# Patient Record
Sex: MALE | Race: WHITE | NOT HISPANIC OR LATINO | Employment: FULL TIME | URBAN - METROPOLITAN AREA
[De-identification: names, ages, dates, MRNs, and addresses within clinical notes are randomized per-mention and may not be internally consistent; named-entity substitution may affect disease eponyms.]

---

## 2018-03-19 ENCOUNTER — COMMUNICATION - HEALTHEAST (OUTPATIENT)
Dept: SCHEDULING | Facility: CLINIC | Age: 48
End: 2018-03-19

## 2018-11-30 ENCOUNTER — OFFICE VISIT (OUTPATIENT)
Dept: OPHTHALMOLOGY | Facility: CLINIC | Age: 48
End: 2018-11-30
Payer: COMMERCIAL

## 2018-11-30 DIAGNOSIS — H52.4 PRESBYOPIA: Primary | ICD-10-CM

## 2018-11-30 ASSESSMENT — REFRACTION_MANIFEST
OD_SPHERE: -1.00
OD_SPHERE: -3.00
OD_AXIS: 100
OD_CYLINDER: +1.75
OD_ADD: +1.25
OS_ADD: +1.25
OD_CYLINDER: +0.75
OD_AXIS: 085
OS_AXIS: 090
OS_CYLINDER: +0.50
OS_AXIS: 087
OS_SPHERE: -1.25
OS_SPHERE: -1.00
OS_CYLINDER: +1.25

## 2018-11-30 ASSESSMENT — EXTERNAL EXAM - RIGHT EYE: OD_EXAM: NORMAL

## 2018-11-30 ASSESSMENT — VISUAL ACUITY
OS_SC: 20/20
OD_SC+: -3
METHOD: SNELLEN - LINEAR
METHOD_MR_RETINOSCOPY: 1
OD_SC: 20/25

## 2018-11-30 ASSESSMENT — SLIT LAMP EXAM - LIDS
COMMENTS: NORMAL
COMMENTS: NORMAL

## 2018-11-30 ASSESSMENT — CONF VISUAL FIELD
OS_NORMAL: 1
OD_NORMAL: 1
METHOD: COUNTING FINGERS

## 2018-11-30 ASSESSMENT — EXTERNAL EXAM - LEFT EYE: OS_EXAM: NORMAL

## 2018-11-30 ASSESSMENT — TONOMETRY
IOP_METHOD: ICARE
OD_IOP_MMHG: 19
OS_IOP_MMHG: 20

## 2018-11-30 ASSESSMENT — CUP TO DISC RATIO
OD_RATIO: 0.2
OS_RATIO: 0.25

## 2018-11-30 NOTE — NURSING NOTE
Chief Complaints and History of Present Illnesses   Patient presents with     COMPREHENSIVE EYE EXAM     HPI    Affected eye(s):  Both   Symptoms:     No blurred vision   No floaters   No flashes   No tearing   No Dryness         Do you have eye pain now?:  No      Comments:    Patient notes that he has been having difficulty reading at near, borrowing wife's reading gls.  Patient notes he has never worn gls or CTL.     Marianela Naqvi November 30, 2018 9:36 AM

## 2018-11-30 NOTE — MR AVS SNAPSHOT
After Visit Summary   2018    Jared Basilio    MRN: [id number]           Patient Information     Date Of Birth          1970        Visit Information        Provider Department      2018 9:40 AM Augustine Rodriguez, [facility]        Today's Diagnoses     Presbyopia    -  1       Follow-ups after your visit        Who to contact     Please call your clinic at [phone] to:    Ask questions about your health    Make or cancel appointments    Discuss your medicines    Learn about your test results    Speak to your doctor            Additional Information About Your Visit        MyChart Information     [employer] is an electronic gateway that provides easy, online access to your medical records. With [employer], you can request a clinic appointment, read your test results, renew a prescription or communicate with your care team.     To sign up for [employer] visit the website at [email or web address]   You will be asked to enter the access code listed below, as well as some personal information. Please follow the directions to create your username and password.     Your access code is: [email or web address]  Expires: 2019  6:30 AM     Your access code will  in 90 days. If you need help or a new code, please contact your [facility] or call [phone] for assistance.        Care EveryWhere ID     This is your Care EveryWhere ID. This could be used by other organizations to access your [facility] medical records  [id number]         Blood Pressure from Last 3 Encounters:   07 100/70   05 112/64   05 100/64    Weight from Last 3 Encounters:   07 87.1 kg (192 lb)   05 84.4 kg (186 lb)   05 81.2 kg (179 lb)              We Performed the Following     REFRACTION [91127]        Primary Care Provider    None Specified       No primary provider on file.        Equal Access to Services     JERSON DALTON: Barbara costello  jameel lovemendel Orantes, waaxda luqadaha, qaybta kaalmada ademat, lio mckeonlori bolivar. So [facility] [phone].    ATENCIÓN: Si habla hilaria, tiene a thomas disposición servicios gratuitos de asistencia lingüística. Audrey al [phone].    We comply with applicable federal civil rights laws and [place] laws. We do not discriminate on the basis of race, color, national origin, age, disability, sex, sexual orientation, or gender identity.            Thank you!     Thank you for choosing [facility] OPHTHALMOLOGY  for your care. Our goal is always to provide you with excellent care. Hearing back from our patients is one way we can continue to improve our services. Please take a few minutes to complete the written survey that you may receive in the mail after your visit with us. Thank you!             Your Updated Medication List - Protect others around you: Learn how to safely use, store and throw away your medicines at www.disposemymeds.org.          This list is accurate as of 11/30/18 10:55 AM.  Always use your most recent med list.                   Brand Name Dispense Instructions for use Diagnosis    ANALPRAM-HC CREA 1-2.5 % EX     30 gm    apply hemorrhoid bid    Anal or rectal pain       ANUSOL-HC 25 MG suppository   Generic drug:  hydrocortisone     10    1 suppos hs    Anal or rectal pain       azithromycin 500 MG tablet    ZITHROMAX    6    1 TABLET DAILY x 3 days prn traveler's diarrhea    Need for prophylactic vaccination and inoculation against viral hepatitis       IMODIUM A-D 2 MG tablet   Generic drug:  loperamide     0    1 prn diarrhea        MALARONE 250-100 MG tablet   Generic drug:  atovaquone-proguanil     30    1TAB PO DAILY start 1 day prior to malaria area and continue until 1 week after leaving  malaria area    Other specified counseling       VIVOTIF CARA VACCINE CPEC   OR     4    1 CAP PO QOD 1 hour ac x 4 doses, keep refrigerated    Need for prophylactic vaccination with  typhoid-paratyphoid alone (TAB)

## 2018-11-30 NOTE — PROGRESS NOTES
Assessment/Plan  (H52.4) Presbyopia  (primary encounter diagnosis)  Comment: With mild astigmatism  Plan: REFRACTION [07821]        SRx updated and released. Patient should RTC with vision changes. Plan on monitoring about every 2 years. OTC reading specs should work ok but with patient's level of astigmatism he may prefer prescription reading glasses.       Complete documentation of historical and exam elements from today's encounter can  be found in the full encounter summary report (not reduplicated in this progress  note). I personally obtained the chief complaint(s) and history of present illness. I  confirmed and edited as necessary the review of systems, past medical/surgical  history, family history, social history, and examination findings as documented by  others; and I examined the patient myself. I personally reviewed the relevant tests,  images, and reports as documented above. I formulated and edited as necessary the  assessment and plan and discussed the findings and management plan with the  patient and family.    Augustine Rodriguez, OD

## 2019-03-20 ENCOUNTER — COMMUNICATION - HEALTHEAST (OUTPATIENT)
Dept: SCHEDULING | Facility: CLINIC | Age: 49
End: 2019-03-20

## 2019-03-31 ENCOUNTER — OFFICE VISIT (OUTPATIENT)
Dept: URGENT CARE | Facility: URGENT CARE | Age: 49
End: 2019-03-31
Payer: COMMERCIAL

## 2019-03-31 ENCOUNTER — ANCILLARY PROCEDURE (OUTPATIENT)
Dept: RADIOLOGY | Facility: URGENT CARE | Age: 49
End: 2019-03-31
Payer: COMMERCIAL

## 2019-03-31 VITALS
WEIGHT: 166 LBS | SYSTOLIC BLOOD PRESSURE: 104 MMHG | BODY MASS INDEX: 22.48 KG/M2 | TEMPERATURE: 98.3 F | HEART RATE: 94 BPM | OXYGEN SATURATION: 96 % | RESPIRATION RATE: 20 BRPM | HEIGHT: 72 IN | DIASTOLIC BLOOD PRESSURE: 61 MMHG

## 2019-03-31 DIAGNOSIS — R05.9 COUGH: ICD-10-CM

## 2019-03-31 DIAGNOSIS — B96.89 ACUTE BACTERIAL SINUSITIS: Primary | ICD-10-CM

## 2019-03-31 DIAGNOSIS — J01.90 ACUTE BACTERIAL SINUSITIS: Primary | ICD-10-CM

## 2019-03-31 PROCEDURE — 99202 OFFICE O/P NEW SF 15 MIN: CPT | Performed by: NURSE PRACTITIONER

## 2019-03-31 PROCEDURE — 71046 X-RAY EXAM CHEST 2 VIEWS: CPT | Mod: TC | Performed by: NURSE PRACTITIONER

## 2019-03-31 RX ORDER — BENZONATATE 200 MG/1
200 CAPSULE ORAL 3 TIMES DAILY PRN
Qty: 20 CAPSULE | Refills: 0 | Status: SHIPPED | OUTPATIENT
Start: 2019-03-31 | End: 2019-04-07

## 2019-03-31 RX ORDER — AMOXICILLIN AND CLAVULANATE POTASSIUM 875; 125 MG/1; MG/1
1 TABLET, FILM COATED ORAL 2 TIMES DAILY
Qty: 14 TABLET | Refills: 0 | Status: SHIPPED | OUTPATIENT
Start: 2019-03-31 | End: 2019-04-07

## 2019-03-31 RX ORDER — METHYLPHENIDATE 30 MG/9H
PATCH TRANSDERMAL
Refills: 0 | COMMUNITY
Start: 2019-03-12

## 2019-03-31 RX ORDER — AMOXICILLIN AND CLAVULANATE POTASSIUM 875; 125 MG/1; MG/1
1 TABLET, FILM COATED ORAL 2 TIMES DAILY
Qty: 14 TABLET | Refills: 0 | Status: SHIPPED | OUTPATIENT
Start: 2019-03-31 | End: 2019-03-31

## 2019-03-31 RX ORDER — BENZONATATE 200 MG/1
200 CAPSULE ORAL 3 TIMES DAILY PRN
Qty: 20 CAPSULE | Refills: 0 | Status: SHIPPED | OUTPATIENT
Start: 2019-03-31 | End: 2019-03-31

## 2019-03-31 SDOH — HEALTH STABILITY: MENTAL HEALTH: HOW OFTEN DO YOU HAVE A DRINK CONTAINING ALCOHOL?: NEVER

## 2019-03-31 ASSESSMENT — PAIN SCALES - GENERAL: PAINLEVEL: 0-NO PAIN

## 2019-03-31 NOTE — PROGRESS NOTES
Urgent Care Visit Note    Subjective   Chief Complaint  Cough, body aches, fever, sinus congestion    History of Present Illness  John Robles is a 48 y.o. male presenting for a 11 day history of nasal congestion, body aches, low-grade fever last documented yesterday, and fatigue that initially improved and worsened again approximately 3 days ago.  His children had influenza A.   He denies chest pain, abdominal discomfort, dizziness, concern for dehydration, or shortness of breath. Self-cares have included Mucinex with minimal relief of symptoms.  He denies a history of asthma, diabetes, or immunocompromised state.      Review of Systems  Pertinent positives and negatives as per the HPI    No Known Allergies  Current Outpatient Medications   Medication Sig Dispense Refill   • DAYTRANA 30 mg/9 hr   0   • amoxicillin-pot clavulanate (AUGMENTIN) 875-125 mg per tablet Take 1 tablet by mouth 2 (two) times a day for 7 days 14 tablet 0   • benzonatate (TESSALON) 200 mg capsule Take 1 capsule (200 mg total) by mouth 3 (three) times a day as needed for cough for up to 7 days 20 capsule 0     No current facility-administered medications for this visit.        Objective   Vital Signs  /61 (BP Location: Right arm, Patient Position: Sitting, Cuff Size: Reg)   Pulse 94   Temp 36.8 °C (98.3 °F)   Resp 20   Ht 1.829 m (6')   Wt 75.3 kg (166 lb)   SpO2 96%   BMI 22.51 kg/m²     Physical Exam  General Appearance: Well developed, well-nourished male in no acute distress  Eyes:  Normal conjunctiva and eyelids.  No periorbital edema.  ENT:  No external lesions, scars, or masses. Bilateral external auditory canals clear.  Bilateral tympanic membranes shiny, translucent, and ton-gray with visualization of light reflex and bony landmarks.  Inflamed nasal turbinates with purulent nasal discharge present. Oral cavity normal.  Cobblestoning posterior oropharynx  Cardiovascular:  Regular rhythm and rate with no significant  murmur or gallop. S1S2.  Respiratory: Rhonchi right mid lobe.  Remaining lobes clear. Respiratory pattern unlabored with no use of accessory muscles.  Skin:  Warm and dry.  Capillary refill brisk.   Heme/Lymph/Immun:  No lymphadenopathy present in neck lymph node chains  Neurovascular:  Alert and oriented.  No focal abnormalities.  Psychiatric: Normal mood, affect, and cognition.  Normal speech rome.  Responsive, engaged in discussion.    Radiology Review  X-ray Chest 2 Views    Result Date: 3/31/2019  Exam: Chest x-ray, 2 views 03/31/2019 Clinical History: Cough; double sickening post influenza  worsening cough Comparison(s): None Findings: Normal heart size. No lobar consolidation. Mild hyperinflation of lungs.     IMPRESSION: No acute radiographic cardiopulmonary process.     Assessment/Plan   Diagnoses and all orders for this visit:    Acute bacterial sinusitis  -     X-ray chest 2 views  -     amoxicillin-pot clavulanate (AUGMENTIN) 875-125 mg per tablet; Take 1 tablet by mouth 2 (two) times a day for 7 days    Cough  -     benzonatate (TESSALON) 200 mg capsule; Take 1 capsule (200 mg total) by mouth 3 (three) times a day as needed for cough for up to 7 days    Impression:   History and physical exam meet AAO-HNS (2015) diagnostic criteria for acute bacterial rhinosinusitis (ABRS).  Cough is most likely secondary to postnasal drainage; chest x-ray is negative.  A watch and wait approach to therapy was presented to the patient, however, a mutual decision to proceed with empiric antibiotic therapy for ABRS was made.  Etiology, self-cares, expected illness course, and follow-up criteria were reviewed with the patient as outlined below:     Plan/Patient Education:   · Diagnosis:  You are most-likely experiencing a “sinus infection” or acute rhinosinusitis.  Most sinus infectious are caused by viruses, are self-limited, and do not require antibiotics; less than 2% are caused by bacteria.  Even these will  generally resolve successfully without antibiotic therapy.  Signs of a bacterial sinus infection include purulent (yellow, green, thick) nasal discharge, facial pain/pressure, and/or sinus congestion without improvement after 10 days of symptoms.  People rarely develop severe complications from bacterial rhinosinusitis if they forego antibiotic therapy.  Antibiotics may shorten your illness, but they may also cause adverse events such as diarrhea, antibiotic resistance, and secondary infections.  · Supportive Care/Treatment: Acetaminophen or ibuprofen may be used as needed for discomfort and fever relief.  Intranasal saline irrigation may also improve comfort.  Intranasal corticosteroids (such as Flonase or Nasacort) may decrease inflammation and improve sinus drainage. Please try these supportive cares for the next few weeks.  If symptoms do not improve in 3-5 days, an antibiotic may be started.  This has been sent to your preferred pharmacy.  Please take this as prescribed, and complete the entire course of treatment.  Taking this medication as prescribed should clear the infection and aids in preventing bacterial resistance.  · Probiotics: If you start the antibiotics, please consider taking a probiotic.  Probiotics with scientific evidence for prevention of antibiotic associated diarrhea in adults include brands such as Bio-K+ CL 1285, BioGaia, Culturelle, Digestive Care Travel Probiotic, and Florastor.   · Follow-up:  Should symptoms worsen or not improve within 7 days, please follow-up with your primary care provider or Urgent Care.     References:   AAO-HNS, 2015   Jax & Virgie [UpToDate], 2017   Ari-Antoni, 2017     Patient Education: Ready to learn, no apparent learning barriers were identified; learning preference includes listening.  Explained diagnosis and treatment plan; patient/caregiver expressed understanding of the content.    Harper Amato, DNP, CNP, FNP-C  3/31/2019

## 2019-03-31 NOTE — PATIENT INSTRUCTIONS
Plan/Patient Education:   · Diagnosis:  You are most-likely experiencing a “sinus infection” or acute rhinosinusitis.  Most sinus infectious are caused by viruses, are self-limited, and do not require antibiotics; less than 2% are caused by bacteria.  Even these will generally resolve successfully without antibiotic therapy.  Signs of a bacterial sinus infection include purulent (yellow, green, thick) nasal discharge, facial pain/pressure, and/or sinus congestion without improvement after 10 days of symptoms.  People rarely develop severe complications from bacterial rhinosinusitis if they forego antibiotic therapy.  Antibiotics may shorten your illness, but they may also cause adverse events such as diarrhea, antibiotic resistance, and secondary infections.  · Supportive Care/Treatment: Acetaminophen or ibuprofen may be used as needed for discomfort and fever relief.  Intranasal saline irrigation may also improve comfort.  Intranasal corticosteroids (such as Flonase or Nasacort) may decrease inflammation and improve sinus drainage. Please try these supportive cares for the next few weeks.  If symptoms do not improve in 3-5 days, an antibiotic may be started.  This has been sent to your preferred pharmacy.  Please take this as prescribed, and complete the entire course of treatment.  Taking this medication as prescribed should clear the infection and aids in preventing bacterial resistance.  · Probiotics: If you start the antibiotics, please consider taking a probiotic.  Probiotics with scientific evidence for prevention of antibiotic associated diarrhea in adults include brands such as Bio-K+ CL 1285, BioGaia, Culturelle, Digestive Care Travel Probiotic, and Florastor.   · Follow-up:  Should symptoms worsen or not improve within 7 days, please follow-up with your primary care provider or Urgent Care.     References:   AAO-HNS, 2015   Jax & Virgie [UpToDate], 2017   Ari-Antoni, 2017

## 2019-08-08 ENCOUNTER — TELEPHONE (OUTPATIENT)
Dept: CALL CENTER | Age: 49
End: 2019-08-08

## 2019-08-08 NOTE — PROGRESS NOTES
HPI:  Something flew in the both eyes from leaf blower. Both eyes are slightly irritated six days ago. Patient is using artificial tears a few times a day.       Pertinent Medical History:    Sinusitis    Allergic rhinitis    Ocular History:    None    Eye Medications:    Visine dry eyes    Artificial tears     Assessment and Plan:  1.   Corneal Foreign Body, left eye.     Looks superficial and not likely to cause scarring.     Start ocuflox QID left eye only for one week.    Start preservative free artificial tears QID both eyes for one week.           Medical History:  Past Medical History:   Diagnosis Date     Allergic rhinitis, cause unspecified      Unspecified sinusitis (chronic)        Medications:  Current Outpatient Medications   Medication Sig Dispense Refill     ANALPRAM-HC CREA 1-2.5 % EX apply hemorrhoid bid 30 gm 1     ANUSOL-HC 25 MG RE SUPP 1 suppos hs  10 1     AZITHROMYCIN 500 MG OR TABS 1 TABLET DAILY x 3 days prn traveler's diarrhea 6 0     IMODIUM A-D 2 MG OR TABS 1 prn diarrhea 0 0     MALARONE 250-100 MG OR TABS 1TAB PO DAILY start 1 day prior to malaria area and continue until 1 week after leaving  malaria area 30 2     VIVOTIF CARA VACCINE CPEC   OR 1 CAP PO QOD 1 hour ac x 4 doses, keep refrigerated 4 0   Complete documentation of historical and exam elements from today's encounter can be found in the full encounter summary report (not reduplicated in this progress note). I personally obtained the chief complaint(s) and history of present illness.  I confirmed and edited as necessary the review of systems, past medical/surgical history, family history, social history, and examination findings as documented by others; and I examined the patient myself. I personally reviewed the relevant tests, images, and reports as documented above. I formulated and edited as necessary the assessment and plan and discussed the findings and management plan with the patient and family. - Sola Quintero OD

## 2019-08-08 NOTE — TELEPHONE ENCOUNTER
".[facility]: Nurse Triage Note  SITUATION/BACKGROUND                                                      Jared Basilio is a 49 year old male who wife calls to report patient having redness and irritation in both of his eyes. Left > right. Wife stated that he was wearing safety glasses, but not goggles. About five days ago patient was blowing out his in - laws garage and believes to have metal particles in his left eye. Feels like something is in the left  eye\".    Intermittent pain/ irritation in left eye. No loss of vision. Applying OTC drops with minimal relief. Patient is out of town and able to drive home. Calling for Eye appointment.   Connected to scheduling and appointment made for tomorrow at 11 am for in Eye Clinic for evaluation.      RECOMMENDATION/PLAN                                                      RECOMMENDED DISPOSITION:  Seek medical care within 24 hrs. Appointment in Eye clinic tomorrow.  Will comply with recommendation: Yes    If further questions/concerns or if symptoms do not improve, worsen or new symptoms develop, call your PCP or [phone] to talk with the Resident on call, as soon as possible.    Guideline used: Eye Problems  Telephone Triage Protocols for Nurses, Fifth Edition, Teri Madrid RN  "

## 2019-08-09 ENCOUNTER — OFFICE VISIT (OUTPATIENT)
Dept: OPHTHALMOLOGY | Facility: CLINIC | Age: 49
End: 2019-08-09
Attending: OPTOMETRIST
Payer: COMMERCIAL

## 2019-08-09 DIAGNOSIS — T15.02XA FOREIGN BODY OF LEFT CORNEA, INITIAL ENCOUNTER: Primary | ICD-10-CM

## 2019-08-09 PROCEDURE — G0463 HOSPITAL OUTPT CLINIC VISIT: HCPCS | Mod: ZF

## 2019-08-09 RX ORDER — CARBOXYMETHYLCELLULOSE SODIUM 5 MG/ML
1 SOLUTION/ DROPS OPHTHALMIC 4 TIMES DAILY
Qty: 1 BOTTLE | Refills: 11 | Status: SHIPPED | OUTPATIENT
Start: 2019-08-09 | End: 2020-05-18

## 2019-08-09 RX ORDER — OFLOXACIN 3 MG/ML
1-2 SOLUTION/ DROPS OPHTHALMIC 4 TIMES DAILY
Qty: 1 BOTTLE | Refills: 11 | Status: SHIPPED | OUTPATIENT
Start: 2019-08-09 | End: 2019-08-16

## 2019-08-09 ASSESSMENT — TONOMETRY
IOP_METHOD: ICARE
OS_IOP_MMHG: 16
OD_IOP_MMHG: 18

## 2019-08-09 ASSESSMENT — EXTERNAL EXAM - RIGHT EYE: OD_EXAM: NORMAL

## 2019-08-09 ASSESSMENT — EXTERNAL EXAM - LEFT EYE: OS_EXAM: NORMAL

## 2019-08-09 ASSESSMENT — VISUAL ACUITY
METHOD: SNELLEN - LINEAR
OS_SC+: +2
OS_SC: 20/20
OD_SC+: +3
OD_SC: 20/20

## 2019-08-09 ASSESSMENT — SLIT LAMP EXAM - LIDS
COMMENTS: NORMAL
COMMENTS: NORMAL

## 2019-08-09 ASSESSMENT — CONF VISUAL FIELD
OS_NORMAL: 1
OD_NORMAL: 1

## 2019-08-09 NOTE — NURSING NOTE
Chief Complaints and History of Present Illnesses   Patient presents with     Corneal Abrasion Evaluation     Chief Complaint(s) and History of Present Illness(es)     Corneal Abrasion Evaluation     Laterality: both eyes    Associated symptoms: blurred vision, photophobia, tearing and discharge.  Negative for eye pain    Treatments tried: eye drops    Response to treatment: mild improvement              Comments     Using leaf blower 6 days ago wearing safety glasses felt like something in BE  Visine dry eye bid BE  Redness and itchy relief x 2 days   Tracey HILLMAN 11:07 AM August 9, 2019

## 2019-08-26 ENCOUNTER — OFFICE VISIT (OUTPATIENT)
Dept: OPHTHALMOLOGY | Facility: CLINIC | Age: 49
End: 2019-08-26
Attending: OPTOMETRIST
Payer: COMMERCIAL

## 2019-08-26 DIAGNOSIS — T15.02XD FOREIGN BODY OF LEFT CORNEA, SUBSEQUENT ENCOUNTER: Primary | ICD-10-CM

## 2019-08-26 PROBLEM — F90.9 ATTENTION DEFICIT HYPERACTIVITY DISORDER (ADHD): Status: ACTIVE | Noted: 2019-08-26

## 2019-08-26 PROCEDURE — G0463 HOSPITAL OUTPT CLINIC VISIT: HCPCS | Mod: ZF

## 2019-08-26 RX ORDER — METHYLPHENIDATE 3.3 MG/H
1 PATCH TRANSDERMAL DAILY
COMMUNITY

## 2019-08-26 ASSESSMENT — VISUAL ACUITY
METHOD: SNELLEN - LINEAR
OS_SC+: +2
OD_SC: 20/25
OS_SC: 20/25
OD_SC+: -2

## 2019-08-26 ASSESSMENT — TONOMETRY
OD_IOP_MMHG: 17
IOP_METHOD: ICARE
OS_IOP_MMHG: 18

## 2019-08-26 ASSESSMENT — REFRACTION_WEARINGRX: SPECS_TYPE: READERS

## 2019-08-26 ASSESSMENT — SLIT LAMP EXAM - LIDS
COMMENTS: NORMAL
COMMENTS: NORMAL

## 2019-08-26 ASSESSMENT — EXTERNAL EXAM - LEFT EYE: OS_EXAM: NORMAL

## 2019-08-26 ASSESSMENT — EXTERNAL EXAM - RIGHT EYE: OD_EXAM: NORMAL

## 2019-08-26 ASSESSMENT — CONF VISUAL FIELD
OS_NORMAL: 1
OD_NORMAL: 1

## 2019-08-26 NOTE — PROGRESS NOTES
HPI:  Something flew in the both eyes from leaf blower. Both eyes are slightly irritated six days ago. Patient is using artificial tears a few times a day.        Pertinent Medical History:    Sinusitis    Allergic rhinitis    Ocular History:    Corneal Foreign Body, right eye 08/2019    Eye Medications:    Artificial tears    Ocuflox     Assessment and Plan:  1.   Corneal Foreign Body, left eye. Resolved    No corneal scar seen.     Discontinue ocuflox QID left eye only for one week.    2.   Dry Eye Syndrome, both eyes.    Start preservative free artificial tears QID both eyes.    Return for punctal plugs if desires.           Medical History:  Past Medical History:   Diagnosis Date     Allergic rhinitis, cause unspecified      Unspecified sinusitis (chronic)        Medications:  Current Outpatient Medications   Medication Sig Dispense Refill     ANALPRAM-HC CREA 1-2.5 % EX apply hemorrhoid bid 30 gm 1     ANUSOL-HC 25 MG RE SUPP 1 suppos hs  10 1     AZITHROMYCIN 500 MG OR TABS 1 TABLET DAILY x 3 days prn traveler's diarrhea 6 0     carboxymethylcellulose PF (CARBOXYMETHYLCELLULOSE SODIUM) 0.5 % ophthalmic solution Place 1 drop into both eyes 4 times daily 1 Bottle 11     IMODIUM A-D 2 MG OR TABS 1 prn diarrhea 0 0     MALARONE 250-100 MG OR TABS 1TAB PO DAILY start 1 day prior to malaria area and continue until 1 week after leaving  malaria area 30 2     VIVOTIF CARA VACCINE CPEC   OR 1 CAP PO QOD 1 hour ac x 4 doses, keep refrigerated 4 0   Complete documentation of historical and exam elements from today's encounter can be found in the full encounter summary report (not reduplicated in this progress note). I personally obtained the chief complaint(s) and history of present illness.  I confirmed and edited as necessary the review of systems, past medical/surgical history, family history, social history, and examination findings as documented by others; and I examined the patient myself. I personally reviewed  the relevant tests, images, and reports as documented above. I formulated and edited as necessary the assessment and plan and discussed the findings and management plan with the patient and family. - Sola Quintero OD

## 2020-04-10 ENCOUNTER — VIRTUAL VISIT (OUTPATIENT)
Dept: FAMILY MEDICINE | Facility: OTHER | Age: 50
End: 2020-04-10

## 2020-04-10 NOTE — PROGRESS NOTES
"Date: 04/10/2020 09:14:03  Clinician: Netta Winter  Clinician NPI: [id number]  Patient: Jared Basilio  Patient : 1970  Patient Address: [address] [place] [address]  Patient Phone: [phone]  Visit Protocol: URI  Patient Summary:  Jared is a 49 year old ( : 1970 ) male who initiated a Visit for cold, sinus infection, or influenza. When asked the question \"Please sign me up to receive news, health information and promotions from [employer].\", Jared responded \"No\".    Jared states his symptoms started gradually 2-3 weeks ago. After his symptoms started, they improved and then got worse again.   His symptoms consist of a cough and malaise. He is experiencing mild difficulty breathing with activities but can speak normally in full sentences.   Symptom details   Cough: Jared coughs a few times an hour and his cough is more bothersome at night. Phlegm comes into his throat when he coughs. He does not believe his cough is caused by post-nasal drip. The color of the phlegm is clear.    Jared denies having rhinitis, facial pain or pressure, myalgias, sore throat, nasal congestion, ear pain, enlarged lymph nodes, chills, diarrhea, vomiting, nausea, teeth pain, headache, fever, and wheezing. He also denies taking antibiotic medication for the symptoms and having recent facial or sinus surgery in the past 60 days.   Precipitating events  He has not recently been exposed to someone with influenza. Jared has been in close contact with the following high risk individuals: people with asthma, heart disease or diabetes and immunocompromised people.   Pertinent COVID-19 (Coronavirus) information  Jared has not traveled internationally or to the areas where COVID-19 (Coronavirus) is widespread, including cruise ship travel in the last 14 days before the start of his symptoms.   Jared has not had a close contact with a laboratory-confirmed COVID-19 patient within 14 days of symptom onset. He also " has not had a close contact with a suspected COVID-19 patient within 14 days of symptom onset.   Jared does not work or volunteer as healthcare worker or a  and does not work or volunteer in a healthcare facility. He does not live with a healthcare worker.   Pertinent medical history  Jared does not need a return to work/school note.   Weight: 175 lbs   Jared does not smoke or use smokeless tobacco.   Additional information as reported by the patient (free text): On March 13 was achy and had a sore throat and stuffy nose. The aches continued and a dry cough developed. Sometimes, I couldn't sleep lying down I was coughing so much. Had to sit up in a sofa. No known fever. I was fatigued for about 2 weeks. I got energy back but the feeling of a tight chest or asthma persisted. Now I am run down by this symptom that doesn't seem to go away- I also take Musinex to thin the cough-   Weight: 175 lbs    MEDICATIONS: Daytrana transdermal, ALLERGIES: NKDA  Clinician Response:  Dear Jared,   At this time your symptoms are consistent with a persistent viral infection.&nbsp;  Please follow up with your primary care provider or submit another oncare visit if you develop fevers or your phlegm changes from clear to green/dark yellow in color.&nbsp;  Take the cough medication as prescribed and use the inhaler as needed for difficulty breathing.&nbsp;  If symptoms worsen or you have sever shortness of breath, please go to the ER.&nbsp;    Diagnosis: Cough  Diagnosis ICD: R05  Prescription: albuterol sulfate (Proventil HFA) 90 mcg/actuation inhalation HFA aerosol inhaler 1 200 inhalation aerosol with adapter (proventil hfa or equivalent), 10 days supply. Inhale 2 puffs every 6 hours as needed for shortness of breath. Refills: 0, Refill as needed: no, Allow substitutions: yes  Prescription: brompheniramine-pseudoeph-DM (Bromfed DM) 2-30-10 mg/5 mL oral syrup 280 milliliter, 7 days supply. Take 10 milliliters by mouth  every 6 hours for 7 days. Refills: 0, Refill as needed: no, Allow substitutions: yes  Pharmacy: [id number] - [phone] - [address] [place] [address]

## 2020-05-18 ENCOUNTER — VIRTUAL VISIT (OUTPATIENT)
Dept: FAMILY MEDICINE | Facility: CLINIC | Age: 50
End: 2020-05-18
Payer: COMMERCIAL

## 2020-05-18 ENCOUNTER — COMMUNICATION - HEALTHEAST (OUTPATIENT)
Dept: SCHEDULING | Facility: CLINIC | Age: 50
End: 2020-05-18

## 2020-05-18 ENCOUNTER — NURSE TRIAGE (OUTPATIENT)
Dept: NURSING | Facility: CLINIC | Age: 50
End: 2020-05-18

## 2020-05-18 DIAGNOSIS — Z20.828 EXPOSURE TO SARS-ASSOCIATED CORONAVIRUS: Primary | ICD-10-CM

## 2020-05-18 DIAGNOSIS — J45.21 MILD INTERMITTENT REACTIVE AIRWAY DISEASE WITH ACUTE EXACERBATION: ICD-10-CM

## 2020-05-18 DIAGNOSIS — J40 BRONCHITIS: Primary | ICD-10-CM

## 2020-05-18 PROBLEM — J45.990 EXERCISE-INDUCED ASTHMA: Status: ACTIVE | Noted: 2020-05-18

## 2020-05-18 PROCEDURE — 99214 OFFICE O/P EST MOD 30 MIN: CPT | Mod: GT | Performed by: FAMILY MEDICINE

## 2020-05-18 RX ORDER — PREDNISONE 20 MG/1
40 TABLET ORAL DAILY
Qty: 10 TABLET | Refills: 0 | Status: SHIPPED | OUTPATIENT
Start: 2020-05-18 | End: 2020-05-23

## 2020-05-18 RX ORDER — AZITHROMYCIN 250 MG/1
TABLET, FILM COATED ORAL
Qty: 6 TABLET | Refills: 0 | Status: SHIPPED | OUTPATIENT
Start: 2020-05-18 | End: 2022-05-23

## 2020-05-18 RX ORDER — BUDESONIDE AND FORMOTEROL FUMARATE DIHYDRATE 160; 4.5 UG/1; UG/1
2 AEROSOL RESPIRATORY (INHALATION) 2 TIMES DAILY
Qty: 6 G | Refills: 1 | Status: SHIPPED | OUTPATIENT
Start: 2020-05-18 | End: 2020-05-20

## 2020-05-18 RX ORDER — ALBUTEROL SULFATE 90 UG/1
AEROSOL, METERED RESPIRATORY (INHALATION)
COMMUNITY
Start: 2020-04-11

## 2020-05-18 NOTE — PROGRESS NOTES
"Jared Basilio is a 49 year old male who is being evaluated via a billable video visit.      The patient has been notified of following:     \"This video visit will be conducted via a call between you and your physician/provider. We have found that certain health care needs can be provided without the need for an in-person physical exam.  This service lets us provide the care you need with a video conversation.  If a prescription is necessary we can send it directly to your pharmacy.  If lab work is needed we can place an order for that and you can then stop by our lab to have the test done at a later time.    Video visits are billed at different rates depending on your insurance coverage.  Please reach out to your insurance provider with any questions.    If during the course of the call the physician/provider feels a video visit is not appropriate, you will not be charged for this service.\"    Patient has given verbal consent for Video visit? Yes    How would you like to obtain your AVS? Mail a copy    Patient would like the video invitation sent by: Text cell phone: [phone]    Will anyone else be joining your video visit? No    Subjective     Jared Basilio is a 49 year old male who presents today via video visit for the following health issues:    HPI  Acute Illness   Acute illness concerns: cough  Onset: 6 weeks    Fever: no     Chills/Sweats: no     Headache (location?): no     Sinus Pressure:no    Conjunctivitis:  no    Ear Pain: no    Rhinorrhea: YES    Congestion: YES    Sore Throat: no      Cough: YES    Wheeze: YES    Decreased Appetite: no     Nausea: no     Vomiting: no     Diarrhea:  no     Dysuria/Freq.: no     Fatigue/Achiness: YES- occ.    Sick/Strep Exposure: no      Therapies Tried and outcome: Mucinex-D and Albuterol Inhaler- yes, effective      Video Start Time: 3:00 PM    Pt and his family (wife and kids) started to get sick with viral URI mid-March 2020.  Then progressed to fatigue and " "congestion.  Also developed sore throat and dry cough.  Has not had a fever yet.  Wife and kids got better but García continues to have URI symptoms.  Has been in quarantine the entire time.   Symptoms worsening about 10 days after symptoms onset.    Has been taking albuterol and Mucinex daily.     Having some pressure in his chest when coughing.  Had asthma when he was younger--exercise-induced.    Was talking with doctor friends (who are neighbors) and they recommended to have him see his primary.  Thinks that he might need prednisone.     Reviewed and updated as needed this visit by Provider  Tobacco  Allergies  Meds  Problems  Med Hx  Surg Hx  Fam Hx         Review of Systems   C: NEGATIVE for fever, chills, change in weight  I: NEGATIVE for worrisome rashes, moles or lesions  E: NEGATIVE for vision changes or irritation  CV: NEGATIVE for chest pain, palpitations or peripheral edema  GI: NEGATIVE for nausea, abdominal pain, heartburn, or change in bowel habits  M: NEGATIVE for significant arthralgias or myalgia  H: NEGATIVE for bleeding problems        Objective    There were no vitals taken for this visit.  Estimated body mass index is 3,749.74 kg/m  as calculated from the following:    Height as of 8/31/05: 0.152 m (6\").    Weight as of 11/14/07: 87.1 kg (192 lb).  Physical Exam   GENERAL: Alert and no distress  EYES: Eyes grossly normal to inspection.  No discharge or erythema, or obvious scleral/conjunctival abnormalities.  RESP: No audible wheeze, cough, or visible cyanosis.  No visible retractions or increased work of breathing.    SKIN: Visible skin clear. No significant rash, abnormal pigmentation or lesions.  NEURO: Cranial nerves grossly intact.  Mentation and speech appropriate for age.  PSYCH: Mentation appears normal, affect normal/bright, judgement and insight intact, normal speech and appearance well-groomed.      Diagnostic Test Results:  Labs reviewed in Epic        Assessment & Plan "   Problem List Items Addressed This Visit     None      Visit Diagnoses     Bronchitis    -  Primary    Relevant Medications    budesonide-formoterol (SYMBICORT) 160-4.5 MCG/ACT Inhaler    azithromycin (ZITHROMAX) 250 MG tablet    predniSONE (DELTASONE) 20 MG tablet    Mild intermittent reactive airway disease with acute exacerbation        Relevant Medications    albuterol (PROAIR HFA/PROVENTIL HFA/VENTOLIN HFA) 108 (90 Base) MCG/ACT inhaler    budesonide-formoterol (SYMBICORT) 160-4.5 MCG/ACT Inhaler    predniSONE (DELTASONE) 20 MG tablet         Continue Albuterol PRN  Trial Rx Symbicort for a few weeks, anticipating 2-4 weeks total.  Will start Rx oral prednisone taper if not much better on the Symbicort in the next few days  Will also have him on Azithromycin to cover for possible CAP.      See Patient Instructions  Return in about 1 week (around 5/25/2020) for re-check / follow-up.    Evelin Molina DO  [facility]      Video-Visit Details    Type of service:  Video Visit    Video End Time:3:20 PM    Originating Location (pt. Location): Home    Distant Location (provider location):  [facility]     Platform used for Video Visit: Meghan    Return in about 1 week (around 5/25/2020) for re-check / follow-up.       Evelin Molina DO

## 2020-05-18 NOTE — TELEPHONE ENCOUNTER
Wife calling. 9 weeks of coughing symptoms.  Using mucinex and inhalers all day.    Patient is not with caller.  Unable to triage.  No consent on file.     Transferred to scheduling    Zeenat Mccracken RN  [facility] Nurse Advisor          Reason for Disposition    Nursing judgment or information in reference    Additional Information    Negative: Bluish (or gray) lips or face    Negative: Severe difficulty breathing (e.g., struggling for each breath, speaks in single words)    Negative: Rapid onset of cough and has hives    Negative: Coughing started suddenly after medicine, an allergic food or bee sting    Negative: Difficulty breathing after exposure to flames, smoke, or fumes    Negative: Sounds like a life-threatening emergency to the triager    Negative: Previous asthma attacks and this feels like asthma attack    Negative: Chest pain present when not coughing    Negative: Difficulty breathing    Negative: Passed out (i.e., fainted, collapsed and was not responding)    Negative: Patient sounds very sick or weak to the triager    Negative: Coughed up > 1 tablespoon (15 ml) blood (Exception: blood-tinged sputum)    Negative: Fever > 103 F (39.4 C)    Negative: Fever > 101 F (38.3 C) and over 60 years of age    Negative: Fever > 100.0 F (37.8 C) and has diabetes mellitus or a weak immune system (e.g., HIV positive, cancer chemotherapy, organ transplant, splenectomy, chronic steroids)    Negative: Fever > 100.0 F (37.8 C) and bedridden (e.g., nursing home patient, stroke, chronic illness, recovering from surgery)    Negative: Increasing ankle swelling    Negative: Wheezing is present    Negative: SEVERE coughing spells (e.g., whooping sound after coughing, vomiting after coughing)    Negative: Coughing up marcelino-colored (reddish-brown) or blood-tinged sputum    Negative: Fever present > 3 days (72 hours)    Negative: Fever returns after gone for over 24 hours and symptoms worse or not improved    Negative:  Using nasal washes and pain medicine > 24 hours and sinus pain persists    Negative: Known COPD or other severe lung disease (i.e., bronchiectasis, cystic fibrosis, lung surgery) and worsening symptoms (i.e., increased sputum purulence or amount, increased breathing difficulty)    Negative: Continuous (nonstop) coughing interferes with work or school and no improvement using cough treatment per Care Advice    Negative: Patient wants to be seen    Cough has been present for > 3 weeks    Protocols used: NO GUIDELINE FDASAEXOV-D-RF, COUGH-A-OH

## 2020-05-19 ENCOUNTER — TELEPHONE (OUTPATIENT)
Dept: FAMILY MEDICINE | Facility: CLINIC | Age: 50
End: 2020-05-19

## 2020-05-19 DIAGNOSIS — J45.31 MILD PERSISTENT REACTIVE AIRWAY DISEASE WITH ACUTE EXACERBATION: Primary | ICD-10-CM

## 2020-05-19 NOTE — TELEPHONE ENCOUNTER
PA Initiation    Medication: budesonide-formoterol (SYMBICORT) 160-4.5 MCG/ACT Inhaler   Insurance Company: [employer] - Phone [phone] Fax [phone]  Pharmacy Filling the Rx: [facility] #[id number] - [place], [place] - [address] AT [facility]  Filling Pharmacy Phone: [phone]  Filling Pharmacy Fax: [phone]  Start Date: 5/19/2020

## 2020-05-19 NOTE — TELEPHONE ENCOUNTER
Prior Authorization Retail Medication Request    Medication/Dose: budesonide-formoterol (SYMBICORT) 160-4.5 MCG/ACT Inhaler    ICD code (if different than what is on RX):     Previously Tried and Failed:     Rationale:       Insurance Name:     Insurance ID:         Pharmacy Information (if different than what is on RX)  Name:     Phone:      [facility] KEY: [id number]

## 2020-05-20 RX ORDER — FLUTICASONE PROPIONATE AND SALMETEROL XINAFOATE 115; 21 UG/1; UG/1
2 AEROSOL, METERED RESPIRATORY (INHALATION) 2 TIMES DAILY
Qty: 8 G | Refills: 1 | Status: SHIPPED | OUTPATIENT
Start: 2020-05-20 | End: 2022-05-23

## 2020-05-20 NOTE — TELEPHONE ENCOUNTER
PRIOR AUTHORIZATION DENIED    Medication: budesonide-formoterol (SYMBICORT) 160-4.5 MCG/ACT Inhaler--DENIED    Denial Date: 5/19/2020    Denial Rational: Patient needs to try 3 preferred alternatives which area fluticasone/salmeterol (generic Airduo), brand Advair (Diskus and HFA) and Breo Ellipta.     Appeal Information:

## 2020-05-20 NOTE — TELEPHONE ENCOUNTER
Patient Contact    Attempt # 1    Was call answered?  No.  Left message on voicemail with information to call me back.    On call back:    -Alert pt that different script was sent

## 2020-05-21 NOTE — TELEPHONE ENCOUNTER
Left voice message asking pt to call triage back. On call back, please let pt know of medication change.

## 2020-06-17 ENCOUNTER — NURSE TRIAGE (OUTPATIENT)
Dept: NURSING | Facility: CLINIC | Age: 50
End: 2020-06-17

## 2020-06-17 DIAGNOSIS — Z11.59 SCREENING FOR VIRAL DISEASE: Primary | ICD-10-CM

## 2020-06-17 NOTE — TELEPHONE ENCOUNTER
"Patient is calling requesting COVID serologic antibody testing.  NOTE: Serologic testing is a blood test for 'antibodies' which are made at 10-14 days after you have had symptoms of COVID or were exposed and had an asymptomatic infection.  This does NOT test you for 'active' infection or tell you if you are contagious.    Are you a healthcare worker?  No  Do you currently have a cough, fever, body aches, shortness of breath, or difficulty breathing?  No  Did you previously have cough, fever, body aches, shortness of breath, or difficulty breathing that have now resolved? Has had previous covid symptoms.   Symptoms began 14 days ago.  Symptoms started > 14 days ago. Lab order placed per SARS-CoV-2 Serology test Standing Order using indication \"Previously symptomatic >14d since onset, currently asymptomatic\" and diagnosis code \"Screening for viral disease\" (Z11.59)      The patient was informed: \"Testing is limited each day and it may take time for testing to be available to everyone who has called. You will receive a call within 48-72 hours to schedule the serology testing. Please confirm the best number to reach you is [phone]. If you have any questions about scheduling, call [phone].\"       Karina Wang RN  "

## 2020-06-23 DIAGNOSIS — Z20.828 EXPOSURE TO SARS-ASSOCIATED CORONAVIRUS: ICD-10-CM

## 2020-06-23 DIAGNOSIS — Z11.59 SCREENING FOR VIRAL DISEASE: ICD-10-CM

## 2020-06-23 PROCEDURE — 86769 SARS-COV-2 COVID-19 ANTIBODY: CPT | Mod: 90 | Performed by: EMERGENCY MEDICINE

## 2020-06-23 PROCEDURE — 99000 SPECIMEN HANDLING OFFICE-LAB: CPT | Performed by: EMERGENCY MEDICINE

## 2020-06-23 PROCEDURE — 36415 COLL VENOUS BLD VENIPUNCTURE: CPT | Performed by: EMERGENCY MEDICINE

## 2020-06-23 NOTE — LETTER
June 26, 2020        Jared Basilio  [address] [place] [id number]      COVID-19 Antibody Screen   Date Value Ref Range Status   06/23/2020 Negative  Final     Comment:     No COVID-19 antibodies detected.  Patients within 10 days of symptom onset for   COVID-19 may not produce sufficient levels of detectable antibodies.    Immunocompromised COVID-19 patients may take longer to develop antibodies.       COVID-19 Antibody, IgG Titer   Date Value Ref Range Status   06/23/2020 Not Applicable  Final     Comment:     Qualitative screen for total antibodies to COVID-19 (SARS-CoV-2) with   semi-quantitative measurement of IgG COVID-19 antibodies by endpoint titer.    COVID-19 antibodies may be elevated due to a past or current infection.  Negative results do not rule out COVID-19 infection.  Results from antibody   testing should not be used as the sole basis to diagnose or exclude SARS-CoV-2   infection or to inform infection status.  COVID-19 PCR test should be ordered   if current infection is suspected.  False positive results may occur in rare   cases due to cross-reacting antibodies.  This test was developed and its performance characteristics determined by the   [facility]),   which is regulated under CLIA as qualified to perform high-complexity testing.    This test has not been reviewed by the FDA.  Testing performed by [facility], [facility], [address], [address], [place] [address]           You have tested NEGATIVE for COVID-19 antibodies. This suggests you have not had or been exposed to COVID-19. But it does not mean that for sure.     The test finds antibodies in most people 10 days after they get sick. For some people, it takes longer than 10 days for antibodies to show up. Others may never show antibodies against COVID-19, especially if they have weak immune  systems.    If you have COVID-19 symptoms now, please stay home and away from others.     What is antibody testing?    This is a kind of blood test. We take a small sample of your blood, and then test it for something called  antibodies.      Your body makes antibodies to fight infection. If your blood has antibodies for a certain germ, it means you ve been infected with that germ in the past.     Sometimes, antibodies stay in your body for years after you ve had the infection. They can be there even if the germ didn t make you sick. They are a sign that your body fought off the infection.    Will this test find antibodies in everyone who s had COVID-19?    No. The test finds antibodies in most people 10 days after they get sick. For some people, it takes longer than 10 days for antibodies to show up. Others may never show antibodies against COVID-19, especially if they have weak immune systems.    What does it mean if the test finds COVID-19 antibodies?    If we find these antibodies, it suggests:     This person has had the virus.     Their body s immune system fought the virus.     We don t know if this will help protect someone from getting COVID-19 again. Scientists are still learning about this.    What are the signs of COVID-19?    Signs of COVID-19 can appear from 2 to 14 days (up to 2 weeks) after you re infected. Some people have no symptoms or only mild symptoms. Others get very sick. The most common symptoms are:          Cough    Shortness of breath or trouble breathing  Or at least 2 of these symptoms:    Fever    Chills    Repeated shaking with chills    Muscle pain    Headache    Sore throat    Losing your sense of taste or smell    You may have other symptoms. Please contact your doctor or clinic for any symptoms that worry you.    Where can I get more information?     To learn the [place] s guidelines for staying home, please visit the [facility] of Health website at  [email or web address]    To learn more about COVID-19 and how to care for yourself at home, please visit the CDC website at https://www.cdc.gov/coronavirus/2019-ncov/about/steps-when-sick.html    For more options for care at [facility], please visit our website at [email or web address]    [place] [facility] ([facility]) COVID-19 Hotline:  [phone]

## 2020-06-25 LAB
COVID-19 SPIKE RBD ABY TITER: NORMAL
COVID-19 SPIKE RBD ABY: NEGATIVE

## 2020-10-11 ENCOUNTER — AMBULATORY - HEALTHEAST (OUTPATIENT)
Dept: NURSING | Facility: CLINIC | Age: 50
End: 2020-10-11

## 2021-01-15 ENCOUNTER — HEALTH MAINTENANCE LETTER (OUTPATIENT)
Age: 51
End: 2021-01-15

## 2021-06-25 NOTE — TELEPHONE ENCOUNTER
RN triage   Call from pt wife -- pt gave verbal OK to speak /w wife   Pt son dx w/ influenza A and strep on Sunday -- pt has been sick for < 48 hrs -- pt did not have flu shot   Today T = 103.4 --   Has cough and congestion but breathing OK -- no chest pain - no wheeze   Has headache -- can do chin to chest -- pt is oriented   Has body aches  Sleeping OK   No sore throat   No immunosuppression   Reviewed home care advice - including when to have pt seen   Tatiana Coronel RN [facility] RN triage        Reason for Disposition    Patient wants to be seen    Probable influenza (fever) with no complications and not HIGH RISK    Protocols used: INFLUENZA EXPOSURE-A-OH, INFLUENZA - SEASONAL-A-OH

## 2021-09-01 ENCOUNTER — TRANSFERRED RECORDS (OUTPATIENT)
Dept: HEALTH INFORMATION MANAGEMENT | Facility: CLINIC | Age: 51
End: 2021-09-01

## 2021-09-01 ENCOUNTER — NURSE TRIAGE (OUTPATIENT)
Dept: NURSING | Facility: CLINIC | Age: 51
End: 2021-09-01

## 2021-09-02 NOTE — TELEPHONE ENCOUNTER
"Wife is calling, consent isn't on file, patient gave verbal permission to speak with patient.    Patient got really sick start of lockdown March 2020 and the whole family was ill with a respiratory virus.  He was ill for approx 9 weeks and improved after 2 provider appointments.      Fall of 2020 through 2021 summer patient has been having moments of \"brown outs\" where patient would get up really fast or go up a flight of stairs and \"feel he was going to pass out\" and it get \"dark\".  The last one was in July 2021.    Starting yesterday, patient's left leg from knee down to ankle is swelling. Patient got on the phone and verbalized that before it started swelling \"it felt sore, thought it was from the calf raises\", but it is only on the left side.      Denies redness, warm areas or fever.    Advised ED tonight, as no PCP to call for 2LT.    Karina Zaman RN on 9/1/2021 at 9:24 PM           Reason for Disposition    [1] Thigh, calf, or ankle swelling AND [2] only 1 side    Additional Information    Negative: Severe difficulty breathing (e.g., struggling for each breath, speaks in single words)    Negative: Looks like a broken bone or dislocated joint (e.g., crooked or deformed)    Negative: Sounds like a life-threatening emergency to the triager    Negative: Chest pain    Negative: Followed a leg injury    Negative: [1] Small area of swelling AND [2] followed an insect bite to the area    Negative: Swelling of one ankle joint    Negative: Swelling of knee is main symptom    Negative: Pregnant    Negative: Postpartum (from 0 to 6 weeks after delivery)    Negative: Difficulty breathing at rest    Negative: Entire foot is cool or blue in comparison to other side    Negative: [1] Can't walk or can barely walk AND [2] new onset    Negative: [1] Difficulty breathing with exertion (e.g., walking) AND [2] new onset or worsening    Negative: [1] Red area or streak AND [2] fever    Negative: [1] Swelling is painful to " touch AND [2] fever    Negative: [1] Cast on leg or ankle AND [2] now increased pain    Negative: Patient sounds very sick or weak to the triager    Negative: SEVERE leg swelling (e.g., swelling extends above knee, entire leg is swollen, weeping fluid)    Negative: [1] Red area or streak [2] large (> 2 in. or 5 cm)    Negative: [1] Thigh or calf pain AND [2] only 1 side AND [3] present > 1 hour    Protocols used: LEG SWELLING AND EDEMA-A-AH

## 2021-10-24 ENCOUNTER — HEALTH MAINTENANCE LETTER (OUTPATIENT)
Age: 51
End: 2021-10-24

## 2022-02-13 ENCOUNTER — HEALTH MAINTENANCE LETTER (OUTPATIENT)
Age: 52
End: 2022-02-13

## 2022-05-23 ENCOUNTER — OFFICE VISIT (OUTPATIENT)
Dept: FAMILY MEDICINE | Facility: CLINIC | Age: 52
End: 2022-05-23
Payer: COMMERCIAL

## 2022-05-23 VITALS
WEIGHT: 182.4 LBS | HEIGHT: 72 IN | RESPIRATION RATE: 16 BRPM | SYSTOLIC BLOOD PRESSURE: 122 MMHG | OXYGEN SATURATION: 97 % | BODY MASS INDEX: 24.71 KG/M2 | DIASTOLIC BLOOD PRESSURE: 85 MMHG | HEART RATE: 77 BPM | TEMPERATURE: 98.5 F

## 2022-05-23 DIAGNOSIS — J45.40 MODERATE PERSISTENT ASTHMA WITHOUT COMPLICATION: ICD-10-CM

## 2022-05-23 DIAGNOSIS — R06.09 DYSPNEA ON EXERTION: Primary | ICD-10-CM

## 2022-05-23 DIAGNOSIS — R53.83 FATIGUE, UNSPECIFIED TYPE: ICD-10-CM

## 2022-05-23 PROCEDURE — 99214 OFFICE O/P EST MOD 30 MIN: CPT | Performed by: FAMILY MEDICINE

## 2022-05-23 PROCEDURE — 93000 ELECTROCARDIOGRAM COMPLETE: CPT | Performed by: FAMILY MEDICINE

## 2022-05-23 RX ORDER — FLUTICASONE PROPIONATE AND SALMETEROL 500; 50 UG/1; UG/1
1 POWDER RESPIRATORY (INHALATION) EVERY 12 HOURS
Qty: 1 EACH | Refills: 3 | Status: SHIPPED | OUTPATIENT
Start: 2022-05-23

## 2022-05-23 ASSESSMENT — ASTHMA QUESTIONNAIRES: ACT_TOTALSCORE: 16

## 2022-05-23 NOTE — PROGRESS NOTES
"  Assessment & Plan     Dyspnea on exertion  Fatigue, unspecified type  Since respiratory illness in March 2020 (unknown if covid)  Know H/o asthma but no treatments until just recently  Episodes of near syncope with exertion up inclines  CXR, EKG normal  Blood work  PFTs next  Likely get Echo and stress test  Consider chest CT    In the interim, Advair 500/50 bid      - Comprehensive metabolic panel; Future  - CBC with platelets; Future  - TSH with free T4 reflex; Future  - Lipid panel; Future  - EKG 12-lead complete w/read - Clinics  - Erythrocyte sedimentation rate auto; Future  - General PFT Lab (Please always keep checked); Future  - Pulmonary Function Test; Future  - XR Chest 2 Views; Future      Moderate persistent asthma without complication  Using old advair 250/50  Increase to 500/50 bid x 1 month    - fluticasone-salmeterol (ADVAIR) 500-50 MCG/ACT inhaler; Inhale 1 puff into the lungs every 12 hours    Follow-up via MyChart  Likely visit in 1 month               Mc Cole MD  [facility]LOU Coleman is a 51 year old who presents for the following health issues     HPI   1. Dyspnea on exertion - ?long covid  In march of 2020 he was sick unsure if it was covid due to no testing available. Having \"long covid\" sxs per pt. Difficulty sustaining physical activity w/o taking breaks. Enjoys outdoor activities and unable to do them without having a \"blackout\" spell, weakness in the legs, SOB (asthma as a kid). Using his wife's inhaler in the morning (unsure which brand) but it does give him some relief. No fainting spells.     Energy levels low  Stop frequently, breathing, dyspnea  Mar 2020 - sick ofr 10 weeks  Antibiotics then - helped    Spells continue especially with inclined    Using advair x 3-4 weeks  In house  Seems to help    Covid vaccinations - J&J, Moderna x 2 (last 4/22/22)  Antibody test (may 2020) - no evidence but >45 days    H/o asthma as kid/high " school                Review of Systems         Objective    /85   Pulse 77   Temp 98.5  F (36.9  C) (Oral)   Resp 16   Ht 1.829 m (6')   Wt 82.7 kg (182 lb 6.4 oz)   SpO2 97%   BMI 24.74 kg/m    Body mass index is 24.74 kg/m .     Physical Exam   GENERAL: healthy, alert and no distress  EYES: Eyes grossly normal to inspection, PERRL and conjunctivae and sclerae normal  HENT: ear canals and TM's normal, nose and mouth without ulcers or lesions  NECK: no adenopathy, no asymmetry, masses, or scars and thyroid normal to palpation  RESP: lungs clear to auscultation - no rales, rhonchi or wheezes  CV: regular rate and rhythm, normal S1 S2, no S3 or S4, no murmur, click or rub, no peripheral edema and peripheral pulses strong  ABDOMEN: soft, nontender, no hepatosplenomegaly, no masses and bowel sounds normal  MS: no gross musculoskeletal defects noted, no edema  NEURO: Normal strength and tone, mentation intact and speech normal  PSYCH: mentation appears normal, affect normal/bright  LYMPH: no cervical, supraclavicular, axillary, or inguinal adenopathy    EKG Interpretation:  By Mc Cole MD  Indication:dyspnea  Symptoms at time of EKG: None   Interpretation: appears normal, NSR, normal axis, normal intervals, no acute ST/T changes c/w ischemia, no LVH by voltage criteria  Comparison with previous EKGs:Previous exam unavailable  Patient informed at visit.    CXR  Normal  perhaps a bit hyperinflated

## 2022-06-07 ENCOUNTER — OFFICE VISIT (OUTPATIENT)
Dept: URGENT CARE | Facility: URGENT CARE | Age: 52
End: 2022-06-07
Payer: COMMERCIAL

## 2022-06-07 VITALS
BODY MASS INDEX: 24.68 KG/M2 | WEIGHT: 182 LBS | DIASTOLIC BLOOD PRESSURE: 70 MMHG | TEMPERATURE: 98.6 F | OXYGEN SATURATION: 98 % | HEART RATE: 89 BPM | SYSTOLIC BLOOD PRESSURE: 110 MMHG

## 2022-06-07 DIAGNOSIS — U07.1 CLINICAL DIAGNOSIS OF COVID-19: Primary | ICD-10-CM

## 2022-06-07 DIAGNOSIS — H66.003 NON-RECURRENT ACUTE SUPPURATIVE OTITIS MEDIA OF BOTH EARS WITHOUT SPONTANEOUS RUPTURE OF TYMPANIC MEMBRANES: ICD-10-CM

## 2022-06-07 PROBLEM — R21 RASH AND OTHER NONSPECIFIC SKIN ERUPTION: Status: ACTIVE | Noted: 2022-06-07

## 2022-06-07 PROBLEM — F33.1 MODERATE RECURRENT MAJOR DEPRESSION (H): Status: ACTIVE | Noted: 2022-06-07

## 2022-06-07 PROBLEM — J32.9 CHRONIC SINUSITIS: Status: ACTIVE | Noted: 2022-06-07

## 2022-06-07 PROBLEM — J02.9 ACUTE SORE THROAT: Status: ACTIVE | Noted: 2022-06-07

## 2022-06-07 PROBLEM — F41.1 GENERALIZED ANXIETY DISORDER: Status: ACTIVE | Noted: 2022-06-07

## 2022-06-07 PROCEDURE — 99214 OFFICE O/P EST MOD 30 MIN: CPT | Performed by: NURSE PRACTITIONER

## 2022-06-07 RX ORDER — AMOXICILLIN 875 MG
875 TABLET ORAL 2 TIMES DAILY
Qty: 14 TABLET | Refills: 0 | Status: SHIPPED | OUTPATIENT
Start: 2022-06-07 | End: 2022-06-14

## 2022-06-07 NOTE — PATIENT INSTRUCTIONS
Paxlovid for covid, renal dosing for GFR of 53  Qualifies given asthma  STOP Advair while on this  Expect gi upset and foul taste  Push fluids, tea with honey, rest  Tylenol is okay  Lungs clear, vitals stable    Only start amox for double ear infection if worsening pain, faint red and bulging bilaterally  PCP for questions or concerns  ER if severe shortness of breath, chest pain, calf pain, bloody sputum, dizziness

## 2022-06-07 NOTE — PROGRESS NOTES
Chief Complaint   Patient presents with     Urgent Care     Medication Request     To treat COVID, patient tested POSITIVE for COVID 6/7, symptom started three days ago     SUBJECTIVE:  Jared Basilio is a 51 year old male presenting with covid. He has had symptoms for 3 days, tested positive today. He would like paxlovid. Has cough, fatigue, runny stuffy nose, fever, tight airway, white mucus. He is on Advair for asthma, understands that he needs to stop this while on the antivirals. His last GFR was 53 and he declines a repeat metabolic panel today. Paxlovid risks, benefits, side effects, other covid treatment options discussed with patient. 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 No calf pain, swelling, chest pain.    Past Medical History:   Diagnosis Date     ADHD (attention deficit hyperactivity disorder)      Allergic rhinitis, cause unspecified      Corneal foreign body      Uncomplicated asthma      Unspecified sinusitis (chronic)      albuterol (PROAIR HFA/PROVENTIL HFA/VENTOLIN HFA) 108 (90 Base) MCG/ACT inhaler, INL 2 PFS PO Q 6 H PRF SOB  fluticasone-salmeterol (ADVAIR) 500-50 MCG/ACT inhaler, Inhale 1 puff into the lungs every 12 hours  methylphenidate (DAYTRANA) 30 MG/9HR patch, Place 1 patch onto the skin daily wear patch for 9 hours only each day    No current facility-administered medications on file prior to visit.    Social History     Tobacco Use     Smoking status: Never Smoker     Smokeless tobacco: Never Used   Substance Use Topics     Alcohol use: No     Allergies   Allergen Reactions     Cats Difficulty breathing     Dust Mites Difficulty breathing     Other Environmental Allergy      Hay/straw- breathing difficulty, rash     Review of Systems   All systems negative except for those listed above in HPI.    OBJECTIVE:   /70   Pulse 89   Temp 98.6  F (37  C) (Tympanic)   Wt 82.6 kg (182 lb)   SpO2 98%   BMI 24.68 kg/m       Physical  Exam  Vitals reviewed.   Constitutional:       Appearance: Normal appearance. He is ill-appearing.   HENT:      Head: Normocephalic and atraumatic.      Ears:      Comments: Bilateral tms faint erythema and bulging.     Nose: Congestion and rhinorrhea present.   Cardiovascular:      Rate and Rhythm: Normal rate.      Pulses: Normal pulses.   Pulmonary:      Effort: Respiratory distress present.      Breath sounds: No stridor. No wheezing, rhonchi or rales.   Chest:      Chest wall: No tenderness.   Musculoskeletal:      Right lower leg: No edema.      Left lower leg: No edema.   Skin:     General: Skin is warm and dry.      Findings: No rash.   Neurological:      General: No focal deficit present.      Mental Status: He is alert and oriented to person, place, and time.   Psychiatric:         Mood and Affect: Mood normal.         Behavior: Behavior normal.       ASSESSMENT:    ICD-10-CM    1. Clinical diagnosis of COVID-19  U07.1 nirmatrelvir and ritonavir (PAXLOVID) therapy pack   2. Non-recurrent acute suppurative otitis media of both ears without spontaneous rupture of tympanic membranes  H66.003 amoxicillin (AMOXIL) 875 MG tablet     PLAN:    Paxlovid for covid, renal dosing for GFR of 53  Qualifies given asthma  STOP Advair while on this  Expect gi upset and foul taste  Push fluids, tea with honey, rest  Tylenol is okay  Lungs clear, vitals stable    Only start amox for double ear infection if worsening pain, faint red and bulging bilaterally  PCP for questions or concerns  ER if severe shortness of breath, chest pain, calf pain, bloody sputum, dizziness    Follow up with primary care provider with any problems, questions or concerns or if symptoms worsen or fail to improve. Patient agreed to plan and verbalized understanding.    Jada Villarreal, JAYLIN-BC  [facility] [place]

## 2022-10-16 ENCOUNTER — HEALTH MAINTENANCE LETTER (OUTPATIENT)
Age: 52
End: 2022-10-16

## 2023-03-26 ENCOUNTER — HEALTH MAINTENANCE LETTER (OUTPATIENT)
Age: 53
End: 2023-03-26

## 2024-04-03 NOTE — CONFIDENTIAL NOTE
NEUROSURGERY- NEW PREVISIT PLANNING       Record Status/Location     Referring Provider  Self   Diagnosis  Lumbar radiculopathy    MRI (HEAD, NECK, SPINE) Na    CT Na    X-ray Na    INJECTION Na    PHYSICAL THERAPY Ordered    SURGERY Na

## 2024-04-08 ENCOUNTER — TELEPHONE (OUTPATIENT)
Dept: NEUROSURGERY | Facility: CLINIC | Age: 54
End: 2024-04-08

## 2024-04-08 NOTE — TELEPHONE ENCOUNTER
LVM with patient because they need to reschedule with Mary pacheco when there is a 60min slot available. Can also be rescheduled with any other NSGY [facility] if more convenient. If scheduled with Mary Blackwood, Zackery Velásquez, or Aileen Horn then it needs to be a new 60 minute appointment.    Luis A Polanco  Visit Facilitator  [facility]

## 2024-04-10 ENCOUNTER — TELEPHONE (OUTPATIENT)
Dept: NEUROSURGERY | Facility: CLINIC | Age: 54
End: 2024-04-10

## 2024-04-10 NOTE — TELEPHONE ENCOUNTER
Left voicemail for patient with appointment reminder for 4/11/24 with Mary Blackwood at 9:00am.    Advised patient to call [phone] with any questions.

## 2024-04-11 ENCOUNTER — OFFICE VISIT (OUTPATIENT)
Dept: NEUROSURGERY | Facility: CLINIC | Age: 54
End: 2024-04-11
Payer: COMMERCIAL

## 2024-04-11 ENCOUNTER — PRE VISIT (OUTPATIENT)
Dept: NEUROSURGERY | Facility: CLINIC | Age: 54
End: 2024-04-11

## 2024-04-11 VITALS
SYSTOLIC BLOOD PRESSURE: 182 MMHG | HEIGHT: 72 IN | HEART RATE: 84 BPM | WEIGHT: 187.1 LBS | DIASTOLIC BLOOD PRESSURE: 108 MMHG | BODY MASS INDEX: 25.34 KG/M2 | OXYGEN SATURATION: 98 %

## 2024-04-11 DIAGNOSIS — M54.16 LUMBAR RADICULOPATHY: Primary | ICD-10-CM

## 2024-04-11 PROCEDURE — 99204 OFFICE O/P NEW MOD 45 MIN: CPT

## 2024-04-11 RX ORDER — METHOCARBAMOL 500 MG/1
500 TABLET, FILM COATED ORAL 4 TIMES DAILY PRN
Qty: 40 TABLET | Refills: 0 | Status: SHIPPED | OUTPATIENT
Start: 2024-04-11 | End: 2024-04-22

## 2024-04-11 RX ORDER — GABAPENTIN 100 MG/1
100 CAPSULE ORAL
COMMUNITY
Start: 2024-04-03

## 2024-04-11 RX ORDER — ACETAMINOPHEN 325 MG/1
325-650 TABLET ORAL EVERY 6 HOURS PRN
COMMUNITY

## 2024-04-11 RX ORDER — IBUPROFEN 100 MG/5ML
10 SUSPENSION, ORAL (FINAL DOSE FORM) ORAL EVERY 6 HOURS PRN
COMMUNITY

## 2024-04-11 NOTE — LETTER
4/11/2024         RE: Jared Basilio  [address] [place] [id number]        Dear Colleague,    Thank you for referring your patient, Jared Basilio, to the [facility] NEUROLOG[facility]. Please see a copy of my visit note below.    [facility] Neurosurgery Clinic Visit      HPI: Jared Basilio is a 53 year old male who presents for evaluation of acute onset left lower extremity pain. Symptoms started last Friday, 4/5/2024.  No known injury or precipitating event however he does note that he was lifting heavy boxes recently.  Today, patient reports left lower extremity pain that radiates to left posterior thigh, left calf and into the dorsum of the left foot. Pain is worse at night, and right when he stands up or starts to walk.  He has associated tingling in the same distribution. Denies any weakness, numbness, falls, foot drop, saddle anesthesia, or bladder/bowel incontinence. Patient has been to [facility] urgent care x 2 for this.  They prescribed prednisone which did not help, as well as gabapentin.  They also referred him to get an MRI scan but this was denied due to his insurance.  He has started physical therapy.  He is taking Tylenol and ibuprofen regularly as well as gabapentin now for pain.  No previous spine surgery.  He has never had this happen to him before.    Past Medical History:   Diagnosis Date     ADHD (attention deficit hyperactivity disorder)      Allergic rhinitis, cause unspecified      Corneal foreign body      Uncomplicated asthma      Unspecified sinusitis (chronic)        Past Medical History reviewed with patient during visit.    Past Surgical History:   Procedure Laterality Date     NO HISTORY OF SURGERY       Past Surgical History reviewed with patient during visit.    Current Outpatient Medications   Medication Sig Dispense Refill     acetaminophen (TYLENOL) 325 MG tablet Take 325-650 mg by mouth every 6 hours as needed for mild pain        albuterol (PROAIR HFA/PROVENTIL HFA/VENTOLIN HFA) 108 (90 Base) MCG/ACT inhaler INL 2 PFS PO Q 6 H PRF SOB       fluticasone-salmeterol (ADVAIR) 500-50 MCG/ACT inhaler Inhale 1 puff into the lungs every 12 hours 1 each 3     gabapentin (NEURONTIN) 100 MG capsule 100 mg       ibuprofen (ADVIL/MOTRIN) 100 MG/5ML suspension Take 10 mg/kg by mouth every 6 hours as needed for fever or moderate pain       methocarbamol (ROBAXIN) 500 MG tablet Take 1 tablet (500 mg) by mouth 4 times daily as needed for muscle spasms 40 tablet 0     methylphenidate (DAYTRANA) 30 MG/9HR patch Place 1 patch onto the skin daily wear patch for 9 hours only each day       No current facility-administered medications for this visit.       Allergies   Allergen Reactions     Cats Difficulty breathing     Dust Mites Difficulty breathing     Other Environmental Allergy      Hay/straw- breathing difficulty, rash       Social History     Socioeconomic History     Marital status:      Spouse name: Karina     Number of children: 0     Years of education: None     Highest education level: None   Occupational History     Employer: SELF EMPLOYED   Tobacco Use     Smoking status: Never     Smokeless tobacco: Never   Vaping Use     Vaping status: Never Used   Substance and Sexual Activity     Alcohol use: No     Drug use: No     Sexual activity: Yes     Partners: Female       Family History   Problem Relation Age of Onset     Family History Negative Mother      Prostate Cancer Father      Glaucoma No family hx of      Macular Degeneration No family hx of      Cancer No family hx of      Coronary Artery Disease No family hx of      Diabetes No family hx of        ROS: 10 point ROS neg other than the symptoms noted above in the HPI.    Vital Signs: BP (!) 182/108   Pulse 84   Ht 6' (1.829 m)   Wt 187 lb 1.6 oz (84.9 kg)   SpO2 98%   BMI 25.38 kg/m      Neurological Examination:  Awake  Alert  Oriented x 3  Speech clear    Motor  exam:  Iliopsoas  (hip flexion)               Right: 5/5  Left:  5/5  Quadriceps  (knee extension)       Right:  5/5  Left:  5/5  Hamstrings  (knee flexion)            Right:  5/5  Left:  5/5  Gastroc Soleus  (PF)                          Right:  5/5  Left:  5/5  Tibialis Ant  (DF)                          Right:  5/5  Left:  5/5  EHL                          Right:  5/5  Left:  5-/5         Sensation normal to light touch    Reflexes are 2+ in the patellar and Achilles. There is no clonus.     Musculoskeletal:  Gait: Able to stand from a seated position. Normal non-antalgic, non-myelopathic gait. Able to heel/toe walk without loss of balance.     Lumbar spine non-TTP    Imaging:   None    Assessment/Plan:   Jared Basilio is a 53 year old male who presents for evaluation of acute onset left lower extremity pain x 6 days.  Symptoms are in a left S1 distribution.  He is intact on examination today.  I advised that he should continue with gabapentin, Tylenol and ibuprofen.  I will also prescribe him Robaxin that he can take at night.  He should continue with physical therapy for the next 6 weeks.  If no improvement in symptoms then he should undergo lumbar MRI scan.  Further discussion will come after lumbar MRI scan for next steps.  Advised he should call back sooner if he develops any weakness, worsening of symptoms.  He should obtain narcotic medications from primary care provider if indicated.    Advised patient to call our clinic with any questions or concerns. Discussed red flag symptoms and advised to seek medical attention if these develop. Patient voiced understanding and agreement.      Mary Blackwood PA-C  [facility] [place] [address]        Again, thank you for allowing me to participate in the care of your patient.        Sincerely,        MARY BLACKWOOD PA-C

## 2024-04-11 NOTE — PROGRESS NOTES
[facility] Neurosurgery Clinic Visit      HPI: Jared Basilio is a 53 year old male who presents for evaluation of acute onset left lower extremity pain. Symptoms started last Friday, 4/5/2024.  No known injury or precipitating event however he does note that he was lifting heavy boxes recently.  Today, patient reports left lower extremity pain that radiates to left posterior thigh, left calf and into the dorsum of the left foot. Pain is worse at night, and right when he stands up or starts to walk.  He has associated tingling in the same distribution. Denies any weakness, numbness, falls, foot drop, saddle anesthesia, or bladder/bowel incontinence. Patient has been to [facility] urgent care x 2 for this.  They prescribed prednisone which did not help, as well as gabapentin.  They also referred him to get an MRI scan but this was denied due to his insurance.  He has started physical therapy.  He is taking Tylenol and ibuprofen regularly as well as gabapentin now for pain.  No previous spine surgery.  He has never had this happen to him before.    Past Medical History:   Diagnosis Date    ADHD (attention deficit hyperactivity disorder)     Allergic rhinitis, cause unspecified     Corneal foreign body     Uncomplicated asthma     Unspecified sinusitis (chronic)        Past Medical History reviewed with patient during visit.    Past Surgical History:   Procedure Laterality Date    NO HISTORY OF SURGERY       Past Surgical History reviewed with patient during visit.    Current Outpatient Medications   Medication Sig Dispense Refill    acetaminophen (TYLENOL) 325 MG tablet Take 325-650 mg by mouth every 6 hours as needed for mild pain      albuterol (PROAIR HFA/PROVENTIL HFA/VENTOLIN HFA) 108 (90 Base) MCG/ACT inhaler INL 2 PFS PO Q 6 H PRF SOB      fluticasone-salmeterol (ADVAIR) 500-50 MCG/ACT inhaler Inhale 1 puff into the lungs every 12 hours 1 each 3    gabapentin (NEURONTIN) 100 MG capsule 100 mg      ibuprofen  (ADVIL/MOTRIN) 100 MG/5ML suspension Take 10 mg/kg by mouth every 6 hours as needed for fever or moderate pain      methocarbamol (ROBAXIN) 500 MG tablet Take 1 tablet (500 mg) by mouth 4 times daily as needed for muscle spasms 40 tablet 0    methylphenidate (DAYTRANA) 30 MG/9HR patch Place 1 patch onto the skin daily wear patch for 9 hours only each day       No current facility-administered medications for this visit.       Allergies   Allergen Reactions    Cats Difficulty breathing    Dust Mites Difficulty breathing    Other Environmental Allergy      Hay/straw- breathing difficulty, rash       Social History     Socioeconomic History    Marital status:      Spouse name: Karina    Number of children: 0    Years of education: None    Highest education level: None   Occupational History     Employer: SELF EMPLOYED   Tobacco Use    Smoking status: Never    Smokeless tobacco: Never   Vaping Use    Vaping status: Never Used   Substance and Sexual Activity    Alcohol use: No    Drug use: No    Sexual activity: Yes     Partners: Female       Family History   Problem Relation Age of Onset    Family History Negative Mother     Prostate Cancer Father     Glaucoma No family hx of     Macular Degeneration No family hx of     Cancer No family hx of     Coronary Artery Disease No family hx of     Diabetes No family hx of        ROS: 10 point ROS neg other than the symptoms noted above in the HPI.    Vital Signs: BP (!) 182/108   Pulse 84   Ht 6' (1.829 m)   Wt 187 lb 1.6 oz (84.9 kg)   SpO2 98%   BMI 25.38 kg/m      Neurological Examination:  Awake  Alert  Oriented x 3  Speech clear    Motor exam:  Iliopsoas  (hip flexion)               Right: 5/5  Left:  5/5  Quadriceps  (knee extension)       Right:  5/5  Left:  5/5  Hamstrings  (knee flexion)            Right:  5/5  Left:  5/5  Gastroc Soleus  (PF)                          Right:  5/5  Left:  5/5  Tibialis Ant  (DF)                          Right:  5/5  Left:   5/5  EHL                          Right:  5/5  Left:  5-/5         Sensation normal to light touch    Reflexes are 2+ in the patellar and Achilles. There is no clonus.     Musculoskeletal:  Gait: Able to stand from a seated position. Normal non-antalgic, non-myelopathic gait. Able to heel/toe walk without loss of balance.     Lumbar spine non-TTP    Imaging:   None    Assessment/Plan:   Jared Basilio is a 53 year old male who presents for evaluation of acute onset left lower extremity pain x 6 days.  Symptoms are in a left S1 distribution.  He is intact on examination today.  I advised that he should continue with gabapentin, Tylenol and ibuprofen.  I will also prescribe him Robaxin that he can take at night.  He should continue with physical therapy for the next 6 weeks.  If no improvement in symptoms then he should undergo lumbar MRI scan.  Further discussion will come after lumbar MRI scan for next steps.  Advised he should call back sooner if he develops any weakness, worsening of symptoms.  He should obtain narcotic medications from primary care provider if indicated.    Advised patient to call our clinic with any questions or concerns. Discussed red flag symptoms and advised to seek medical attention if these develop. Patient voiced understanding and agreement.      Mary Blackwood PA-C  [facility] [place] [address]

## 2024-04-11 NOTE — NURSING NOTE
Jared Basilio is a 53 year old male who presents for:  Chief Complaint   Patient presents with    Consult     lumbar radiculopathy. Pain on left hip and left side of left leg.         Initial Vitals:  BP (!) 182/108   Pulse 84   Ht 6' (1.829 m)   Wt 187 lb 1.6 oz (84.9 kg)   SpO2 98%   BMI 25.38 kg/m   Estimated body mass index is 25.38 kg/m  as calculated from the following:    Height as of this encounter: 6' (1.829 m).    Weight as of this encounter: 187 lb 1.6 oz (84.9 kg).. Body surface area is 2.08 meters squared. BP completed using cuff size: regular  Data Unavailable    Nursing Comments:       Jessica Lay

## 2024-04-22 DIAGNOSIS — M54.16 LUMBAR RADICULOPATHY: ICD-10-CM

## 2024-04-22 RX ORDER — METHOCARBAMOL 500 MG/1
500 TABLET, FILM COATED ORAL 4 TIMES DAILY PRN
Qty: 40 TABLET | Refills: 0 | Status: SHIPPED | OUTPATIENT
Start: 2024-04-22

## 2024-04-22 NOTE — TELEPHONE ENCOUNTER
Patient calling for a refill of Robaxin. Seen by Mary on 4/11. MARGAUX says Robaxin to take at night.  Called for pain assessment     Current symptom(s): reports significant pain relief from Robaxin.  Had been running about a 9/10 pain in left leg and not able to sleep for more than 2 hours.   Started taking Robaxin and has had great relief. Able to sleep and function and be more mobile during the day.   Ran out yesterday and the pain came back. Not as bad as before but is manageable with Robaxin.   He continues to do PT and states the plan is for an MRI after 6 weeks of PT (if still needed)  Discussed the possibility of 1 more refill then defer to PCP. States he doesn't have a PCP    Current pain management:   Advil and tylenol around the clock prn  Gabapentin: as prescribed   Robaxin: QID prn     Last fill: 4/11  Next visit: tbd     Medication pended for your approval, if appropriate. Pharmacy verified.     Any patient questions or concerns:     Informed patient request will be forwarded to care team.

## 2024-04-22 NOTE — TELEPHONE ENCOUNTER
[facility] Call Center    Phone Message    May a detailed message be left on voicemail: yes     Reason for Call: Medication Refill Request    Has the patient contacted the pharmacy for the refill? Yes   Name of medication being requested: methocarbamol (ROBAXIN) 500 MG tablet   Provider who prescribed the medication:     Mary Blackwood PA-C       Pharmacy: [id number] [address]   Date medication is needed: 4/22/2024     Reminded of refill policy    Action Taken: Message routed to:  Other: [facility]    Travel Screening: Not Applicable

## 2024-06-01 ENCOUNTER — HEALTH MAINTENANCE LETTER (OUTPATIENT)
Age: 54
End: 2024-06-01

## 2024-09-12 ENCOUNTER — OFFICE VISIT (OUTPATIENT)
Dept: NEUROSURGERY | Facility: CLINIC | Age: 54
End: 2024-09-12
Payer: COMMERCIAL

## 2024-09-12 VITALS
SYSTOLIC BLOOD PRESSURE: 152 MMHG | DIASTOLIC BLOOD PRESSURE: 108 MMHG | BODY MASS INDEX: 25.06 KG/M2 | OXYGEN SATURATION: 97 % | HEIGHT: 72 IN | WEIGHT: 185 LBS | HEART RATE: 88 BPM

## 2024-09-12 DIAGNOSIS — M54.16 LUMBAR RADICULOPATHY: Primary | ICD-10-CM

## 2024-09-12 PROCEDURE — 99213 OFFICE O/P EST LOW 20 MIN: CPT | Performed by: PHYSICIAN ASSISTANT

## 2024-09-12 RX ORDER — METHOCARBAMOL 500 MG/1
500-1000 TABLET, FILM COATED ORAL 4 TIMES DAILY PRN
Qty: 90 TABLET | Refills: 0 | Status: SHIPPED | OUTPATIENT
Start: 2024-09-12

## 2024-09-12 RX ORDER — METHYLPREDNISOLONE 4 MG
TABLET, DOSE PACK ORAL
Qty: 21 TABLET | Refills: 0 | Status: SHIPPED | OUTPATIENT
Start: 2024-09-12

## 2024-09-12 RX ORDER — METHOCARBAMOL 500 MG/1
500-1000 TABLET, FILM COATED ORAL 4 TIMES DAILY PRN
Qty: 40 TABLET | Refills: 1 | Status: SHIPPED | OUTPATIENT
Start: 2024-09-12 | End: 2024-09-12

## 2024-09-12 ASSESSMENT — PAIN SCALES - GENERAL: PAINLEVEL: EXTREME PAIN (9)

## 2024-09-12 NOTE — LETTER
9/12/2024      Jared Basilio  [address] [place] [id number]      Dear Colleague,    Thank you for referring your patient, Jared Basilio, to the [facility]. Please see a copy of my visit note below.    NEUROSURGERY CLINIC PROGRESS NOTE     DATE OF VISIT: 9/12/2024     SUBJECTIVE:     Jared Basilio is a pleasant 54 year old male who presents to the clinic today for follow up on low back pain and left lumbar radiculopathy. Pertinent medical history consists of seen in April 2024 for similar symptoms- sent to PT and also given steroids and muscle relaxant. Pain resolved on its own.     Patient started to notice similar low back pain like he had in April this past weekend. He went to a session of physical therapy to try to assist with pain. A day later, started to experience severe pain down LLE in L5 distribution. Admits to associated paresthesias and weakness. Last episode in April had left DF weakness and started to notice subtle weakness again. Denies RLE symptoms, bowel/bladder changes, gait changes, recent falls or trauma.     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 Him and wife note this could have attributed to beginning of symptoms. Also has a very important work trip to [place] in 4 days he has been preparing for and still hopeful to make.     Conservative measures tried- physical therapy, NSAIDs, muscle relaxant, light activity. Nothing has provided relief at this time.       Current Outpatient Medications:      methocarbamol (ROBAXIN) 500 MG tablet, Take 1-2 tablets (500-1,000 mg) by mouth 4 times daily as needed for muscle spasms., Disp: 90 tablet, Rfl: 0     methylPREDNISolone (MEDROL DOSEPAK) 4 MG tablet therapy pack, Follow Package Directions, Disp: 21 tablet, Rfl: 0     acetaminophen (TYLENOL) 325 MG tablet, Take 325-650 mg by mouth every 6 hours as needed for mild pain, Disp:  , Rfl:      albuterol (PROAIR HFA/PROVENTIL HFA/VENTOLIN HFA) 108 (90 Base) MCG/ACT inhaler, INL 2 PFS PO Q 6 H PRF SOB, Disp: , Rfl:      fluticasone-salmeterol (ADVAIR) 500-50 MCG/ACT inhaler, Inhale 1 puff into the lungs every 12 hours, Disp: 1 each, Rfl: 3     gabapentin (NEURONTIN) 100 MG capsule, 100 mg, Disp: , Rfl:      ibuprofen (ADVIL/MOTRIN) 100 MG/5ML suspension, Take 10 mg/kg by mouth every 6 hours as needed for fever or moderate pain, Disp: , Rfl:      methocarbamol (ROBAXIN) 500 MG tablet, Take 1 tablet (500 mg) by mouth 4 times daily as needed for muscle spasms, Disp: 40 tablet, Rfl: 0     methylphenidate (DAYTRANA) 30 MG/9HR patch, Place 1 patch onto the skin daily wear patch for 9 hours only each day, Disp: , Rfl:      Allergies   Allergen Reactions     Cats Difficulty breathing     Dust Mites Difficulty breathing     Other Environmental Allergy      Hay/straw- breathing difficulty, rash        Past Medical History:   Diagnosis Date     ADHD (attention deficit hyperactivity disorder)      Allergic rhinitis, cause unspecified      Corneal foreign body      Uncomplicated asthma      Unspecified sinusitis (chronic)         ROS: 10 point review of symptoms are negative other than the symptoms noted above in the HPI.     Family History has been reviewed with the patient, there are no pertinent findings to presenting concern.     Past Surgical History:   Procedure Laterality Date     NO HISTORY OF SURGERY          Social History     Tobacco Use     Smoking status: Never     Smokeless tobacco: Never   Vaping Use     Vaping status: Never Used   Substance Use Topics     Alcohol use: No     Drug use: No        OBJECTIVE:   BP (!) 152/108   Pulse 88   Ht 1.829 m (6')   Wt 83.9 kg (185 lb)   SpO2 97%   BMI 25.09 kg/m     Body mass index is 25.09 kg/m .       Exam:     CN II-XII grossly intact, alert and appropriate with conversation and following commands.   Gait is non-antalgic. Some difficulty walking  on left heels. Able to walk on toes without difficulty.     Lumbar spine is non tender to palpation.  Intact sensation throughout lower extremities.   Bilateral patellar 2/4 and achilles reflex 1/4. +left SLR. Negative right SLR    LE muscle strength  Right  Left    Iliopsoas (hip flexion)  5/5  5/5 , pain limiting   Quad (knee extension)  5/5  5/5    Hamstring (knee flexion)  5/5  5/5    Gastrocnemius (PF)  5/5  5/5    Tibialis Ant. (DF)  5/5  4/5           Negative for clonus.   Calves are soft and non-tender bilaterally.     ASSESSMENT:     Jared Basilio is a pleasant 54 year old male who presents to the clinic today for follow up on low back pain and left lumbar radiculopathy. Pertinent medical history consists of seen in April 2024 for similar symptoms- sent to PT and also given steroids and muscle relaxant. Pain resolved on its own.     Patient started to notice similar low back pain like he had in April this past weekend. He went to a session of physical therapy to try to assist with pain. A day later, started to experience severe pain down LLE in L5 distribution. Admits to associated paresthesias and weakness. Last episode in April had left DF weakness and started to notice subtle weakness again. Denies RLE symptoms, bowel/bladder changes, gait changes, recent falls or trauma.     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 Him and wife note this could have attributed to beginning of symptoms. Also has a very important work trip to [place] in 4 days he has been preparing for and still hopeful to make.     Conservative measures tried- physical therapy, NSAIDs, muscle relaxant, light activity. Nothing has provided relief at this time.     PLAN:  -medrol dosepak (steroid burst) sent to pharmacy, take as instructed  -robaxin- you may take 500-1000 4 times daily as needed. Sent to pharmacy. Do not drive while taking   -lumbar MRI ordered- call  insurance prior to scheduling this. You will need to call to schedule MRI    Call [place] radiology scheduling for your procedure:  For scheduling in the [place] ([place], [facility], and [place]) call [phone] or [phone]      For scheduling at [facility] ([facility], [facility], [place]), call [phone] or [phone]      For scheduling in the [place] ([facility]) call [phone]  or  [phone]      After Mri completed, will contact you with results and next steps     Winsome Yanes PA-C  [facility]  [address] [place] [address]  [address] [place] [address]  Tel [phone]  Fax [phone]      Again, thank you for allowing me to participate in the care of your patient.        Sincerely,        Winsome Tavarez PA-C

## 2024-09-12 NOTE — NURSING NOTE
Jared Basilio is a 54 year old male who presents for:  Chief Complaint   Patient presents with    RECHECK     Hx of lumbar radiculopathy, patient reports pain starting in buttocks and all the way down left leg into big toe. Shooting pain, numbness, tingling, throbbing all reported.        Initial Vitals:  Ht 6' (1.829 m)   Wt 185 lb (83.9 kg)   BMI 25.09 kg/m   Estimated body mass index is 25.09 kg/m  as calculated from the following:    Height as of this encounter: 6' (1.829 m).    Weight as of this encounter: 185 lb (83.9 kg). Body surface area is 2.06 meters squared. BP completed using cuff size: regular  Extreme Pain (9)        [place] [facility]    
No

## 2024-09-12 NOTE — PATIENT INSTRUCTIONS
-medrol dosepak (steroid burst) sent to pharmacy, take as instructed  -robaxin- you may take 500-1000 4 times daily as needed. Sent to pharmacy. Do not drive while taking   -lumbar MRI ordered- call insurance prior to scheduling this. You will need to call to schedule MRI    Call [place] radiology scheduling for your procedure:  For scheduling in the [place] ([place], [facility], and [place]) call [phone] or [phone]      For scheduling at [facility] ([facility]), call [phone] or [phone]      For scheduling in the South ([facility]) call [phone]  or  [phone]      After Mri completed, will contact you with results and next steps     Winsome Yanes PA-C  [facility]  [address] [place] [address]  [address] [place] [address]  Tel [phone]  Fax [phone]

## 2024-09-12 NOTE — PROGRESS NOTES
NEUROSURGERY CLINIC PROGRESS NOTE     DATE OF VISIT: 9/12/2024     SUBJECTIVE:     Jared Basilio is a pleasant 54 year old male who presents to the clinic today for follow up on low back pain and left lumbar radiculopathy. Pertinent medical history consists of seen in April 2024 for similar symptoms- sent to PT and also given steroids and muscle relaxant. Pain resolved on its own.     Patient started to notice similar low back pain like he had in April this past weekend. He went to a session of physical therapy to try to assist with pain. A day later, started to experience severe pain down LLE in L5 distribution. Admits to associated paresthesias and weakness. Last episode in April had left DF weakness and started to notice subtle weakness again. Denies RLE symptoms, bowel/bladder changes, gait changes, recent falls or trauma.     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 Him and wife note this could have attributed to beginning of symptoms. Also has a very important work trip to [place] in 4 days he has been preparing for and still hopeful to make.     Conservative measures tried- physical therapy, NSAIDs, muscle relaxant, light activity. Nothing has provided relief at this time.       Current Outpatient Medications:     methocarbamol (ROBAXIN) 500 MG tablet, Take 1-2 tablets (500-1,000 mg) by mouth 4 times daily as needed for muscle spasms., Disp: 90 tablet, Rfl: 0    methylPREDNISolone (MEDROL DOSEPAK) 4 MG tablet therapy pack, Follow Package Directions, Disp: 21 tablet, Rfl: 0    acetaminophen (TYLENOL) 325 MG tablet, Take 325-650 mg by mouth every 6 hours as needed for mild pain, Disp: , Rfl:     albuterol (PROAIR HFA/PROVENTIL HFA/VENTOLIN HFA) 108 (90 Base) MCG/ACT inhaler, INL 2 PFS PO Q 6 H PRF SOB, Disp: , Rfl:     fluticasone-salmeterol (ADVAIR) 500-50 MCG/ACT inhaler, Inhale 1 puff into the lungs every 12 hours, Disp: 1 each, Rfl:  3    gabapentin (NEURONTIN) 100 MG capsule, 100 mg, Disp: , Rfl:     ibuprofen (ADVIL/MOTRIN) 100 MG/5ML suspension, Take 10 mg/kg by mouth every 6 hours as needed for fever or moderate pain, Disp: , Rfl:     methocarbamol (ROBAXIN) 500 MG tablet, Take 1 tablet (500 mg) by mouth 4 times daily as needed for muscle spasms, Disp: 40 tablet, Rfl: 0    methylphenidate (DAYTRANA) 30 MG/9HR patch, Place 1 patch onto the skin daily wear patch for 9 hours only each day, Disp: , Rfl:      Allergies   Allergen Reactions    Cats Difficulty breathing    Dust Mites Difficulty breathing    Other Environmental Allergy      Hay/straw- breathing difficulty, rash        Past Medical History:   Diagnosis Date    ADHD (attention deficit hyperactivity disorder)     Allergic rhinitis, cause unspecified     Corneal foreign body     Uncomplicated asthma     Unspecified sinusitis (chronic)         ROS: 10 point review of symptoms are negative other than the symptoms noted above in the HPI.     Family History has been reviewed with the patient, there are no pertinent findings to presenting concern.     Past Surgical History:   Procedure Laterality Date    NO HISTORY OF SURGERY          Social History     Tobacco Use    Smoking status: Never    Smokeless tobacco: Never   Vaping Use    Vaping status: Never Used   Substance Use Topics    Alcohol use: No    Drug use: No        OBJECTIVE:   BP (!) 152/108   Pulse 88   Ht 1.829 m (6')   Wt 83.9 kg (185 lb)   SpO2 97%   BMI 25.09 kg/m     Body mass index is 25.09 kg/m .       Exam:     CN II-XII grossly intact, alert and appropriate with conversation and following commands.   Gait is non-antalgic. Some difficulty walking on left heels. Able to walk on toes without difficulty.     Lumbar spine is non tender to palpation.  Intact sensation throughout lower extremities.   Bilateral patellar 2/4 and achilles reflex 1/4. +left SLR. Negative right SLR    LE muscle strength  Right  Left    Iliopsoas  (hip flexion)  5/5  5/5 , pain limiting   Quad (knee extension)  5/5  5/5    Hamstring (knee flexion)  5/5  5/5    Gastrocnemius (PF)  5/5  5/5    Tibialis Ant. (DF)  5/5  4/5           Negative for clonus.   Calves are soft and non-tender bilaterally.     ASSESSMENT:     Jared Basilio is a pleasant 54 year old male who presents to the clinic today for follow up on low back pain and left lumbar radiculopathy. Pertinent medical history consists of seen in April 2024 for similar symptoms- sent to PT and also given steroids and muscle relaxant. Pain resolved on its own.     Patient started to notice similar low back pain like he had in April this past weekend. He went to a session of physical therapy to try to assist with pain. A day later, started to experience severe pain down LLE in L5 distribution. Admits to associated paresthesias and weakness. Last episode in April had left DF weakness and started to notice subtle weakness again. Denies RLE symptoms, bowel/bladder changes, gait changes, recent falls or trauma.     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 Him and wife note this could have attributed to beginning of symptoms. Also has a very important work trip to [place] in 4 days he has been preparing for and still hopeful to make.     Conservative measures tried- physical therapy, NSAIDs, muscle relaxant, light activity. Nothing has provided relief at this time.     PLAN:  -medrol dosepak (steroid burst) sent to pharmacy, take as instructed  -robaxin- you may take 500-1000 4 times daily as needed. Sent to pharmacy. Do not drive while taking   -lumbar MRI ordered- call insurance prior to scheduling this. You will need to call to schedule MRI    Call [place] radiology scheduling for your procedure:  For scheduling in the [place] ([place], [facility], and [place]) call [phone] or [phone]      For scheduling at [facility]  ([facility], [place]), call [phone] or [phone]      For scheduling in the [place] ([facility]) call [phone]  or  [phone]      After Mri completed, will contact you with results and next steps     Winsome Yanes PA-C  [address]  [address] [place] [address]  Tel [phone]  Fax [phone]

## 2024-09-13 ENCOUNTER — HOSPITAL ENCOUNTER (OUTPATIENT)
Dept: MRI IMAGING | Facility: CLINIC | Age: 54
Discharge: HOME | End: 2024-09-13
Attending: PHYSICIAN ASSISTANT | Admitting: PHYSICIAN ASSISTANT
Payer: COMMERCIAL

## 2024-09-13 ENCOUNTER — TELEPHONE (OUTPATIENT)
Dept: NEUROLOGY | Facility: CLINIC | Age: 54
End: 2024-09-13
Payer: COMMERCIAL

## 2024-09-13 DIAGNOSIS — M54.16 LUMBAR RADICULOPATHY: Primary | ICD-10-CM

## 2024-09-13 DIAGNOSIS — M54.16 LUMBAR RADICULOPATHY: ICD-10-CM

## 2024-09-13 PROCEDURE — 72148 MRI LUMBAR SPINE W/O DYE: CPT

## 2024-09-13 NOTE — PROGRESS NOTES
Imaging reviewed with patient wife Karina    Patient noticing decreased pain with steroids and muscle relaxants  He has cancelled his work trip due to severity of symptoms     I have called and got patient scheduled for CHARIS Wednesday (earliest available) 9/18 at 130PM 115PM check in     Will have our office arrange a follow up appt 1.5-2 weeks after CHARIS with LORRIE (myself or Saeed ALEJO) in conjunction with Dr. lEier barrett incase surgery is needed    Karina in agreement     Winsome Yanes PA-C  [facility]  [address] [place] [address]  Tel [phone]  Fax [phone]      MRI LUMBAR SPINE WITHOUT CONTRAST   9/13/2024 11:49 AM      HISTORY: Lumbar radiculopathy with new weakness.     TECHNIQUE: Multiplanar multisequence MRI of the lumbar spine without  contrast.      COMPARISON: None.      FINDINGS: There are 5 lumbar-type vertebral bodies assumed for the  purposes of this dictation. The distal spinal cord and cauda equina  appear normal.      Straightening of the normal lumbar lordosis. No significant loss of  vertebral body height. Small presumed Schmorl's node deformity  inferior L5 endplate with mild surrounding edema. No other focal  destructive bony lesions.     Level by level as follows:      T12-L1: No loss of disc height. No significant disc herniation. Normal  facets. No spinal canal or neural foraminal narrowing.      L1-L2: No loss of disc height. No significant disc herniation. Normal  facets. No spinal canal or neural foraminal narrowing.      L2-L3: Mild loss of disc height and signal. Posterior disc bulge with  mild endplate osteophytic spurring. Mild facet hypertrophy. No  significant spinal canal narrowing. Mild bilateral neural foraminal  narrowing.      L3-L4: Mild loss of disc height. Posterior disc bulge more pronounced  towards the left foraminal region with left greater than right  uncinate spurring. Mild facet hypertrophy. No spinal canal  narrowing.  Mild right neural foraminal narrowing. Moderate left neural foraminal  narrowing.      L4-L5: Mild loss of disc height and signal. Posterior disc bulge with  endplate osteophytic spurring. Annular fusion with disc in the left  subarticular/foraminal region. Bilateral facet hypertrophy. No  significant spinal canal narrowing. Mild right neural foraminal  narrowing. Moderate left neural foraminal narrowing.      L5-S1: Moderate loss of disc height and signal with degenerative  endplate changes. Circumferential disc bulge with mild endplate  osteophytic spurring. Superimposed left foraminal disc extrusion which  extends cranially into the neural foramen and compresses the  traversing left L5 nerve root. No spinal canal narrowing. Mild right  neural foraminal narrowing. Severe left neural foraminal narrowing.      Paraspinous soft tissues: Unremarkable.                                                                       IMPRESSION:    1. Large left foraminal disc extrusion at L5-S1 causing severe left  neural foraminal narrowing which compresses the exiting left L5 nerve  root. There is also mild right neural foraminal narrowing at this  level.  2. Additional degenerative changes throughout the lumbar spine as  detailed above. Moderate left neural foraminal narrowings also at  L4-L5 and L3-L4.     MERLIN AUSTIN MD

## 2024-09-14 ENCOUNTER — HOSPITAL ENCOUNTER (EMERGENCY)
Facility: CLINIC | Age: 54
Discharge: HOME | End: 2024-09-14
Payer: COMMERCIAL

## 2024-09-14 VITALS
OXYGEN SATURATION: 96 % | DIASTOLIC BLOOD PRESSURE: 101 MMHG | WEIGHT: 187 LBS | HEIGHT: 72 IN | RESPIRATION RATE: 16 BRPM | TEMPERATURE: 97.7 F | BODY MASS INDEX: 25.33 KG/M2 | SYSTOLIC BLOOD PRESSURE: 163 MMHG | HEART RATE: 73 BPM

## 2024-09-14 DIAGNOSIS — M54.16 LUMBAR BACK PAIN WITH RADICULOPATHY AFFECTING RIGHT LOWER EXTREMITY: ICD-10-CM

## 2024-09-14 PROCEDURE — 99284 EMERGENCY DEPT VISIT MOD MDM: CPT

## 2024-09-14 PROCEDURE — 250N000013 HC RX MED GY IP 250 OP 250 PS 637

## 2024-09-14 PROCEDURE — 250N000011 HC RX IP 250 OP 636

## 2024-09-14 RX ORDER — ONDANSETRON 4 MG/1
4 TABLET, ORALLY DISINTEGRATING ORAL ONCE
Status: COMPLETED | OUTPATIENT
Start: 2024-09-14 | End: 2024-09-14

## 2024-09-14 RX ORDER — HYDROCODONE BITARTRATE AND ACETAMINOPHEN 5; 325 MG/1; MG/1
1 TABLET ORAL ONCE
Status: COMPLETED | OUTPATIENT
Start: 2024-09-14 | End: 2024-09-14

## 2024-09-14 RX ORDER — ONDANSETRON 4 MG/1
4 TABLET, ORALLY DISINTEGRATING ORAL EVERY 6 HOURS PRN
Qty: 12 TABLET | Refills: 0 | Status: SHIPPED | OUTPATIENT
Start: 2024-09-14

## 2024-09-14 RX ORDER — HYDROCODONE BITARTRATE AND ACETAMINOPHEN 5; 325 MG/1; MG/1
1 TABLET ORAL EVERY 4 HOURS PRN
Qty: 18 TABLET | Refills: 0 | Status: SHIPPED | OUTPATIENT
Start: 2024-09-14 | End: 2024-09-17

## 2024-09-14 RX ADMIN — HYDROCODONE BITARTRATE AND ACETAMINOPHEN 1 TABLET: 5; 325 TABLET ORAL at 06:38

## 2024-09-14 RX ADMIN — ONDANSETRON 4 MG: 4 TABLET, ORALLY DISINTEGRATING ORAL at 06:38

## 2024-09-14 ASSESSMENT — ACTIVITIES OF DAILY LIVING (ADL)
ADLS_ACUITY_SCORE: 35
ADLS_ACUITY_SCORE: 35

## 2024-09-14 ASSESSMENT — COLUMBIA-SUICIDE SEVERITY RATING SCALE - C-SSRS
6. HAVE YOU EVER DONE ANYTHING, STARTED TO DO ANYTHING, OR PREPARED TO DO ANYTHING TO END YOUR LIFE?: NO
1. IN THE PAST MONTH, HAVE YOU WISHED YOU WERE DEAD OR WISHED YOU COULD GO TO SLEEP AND NOT WAKE UP?: NO
2. HAVE YOU ACTUALLY HAD ANY THOUGHTS OF KILLING YOURSELF IN THE PAST MONTH?: NO

## 2024-09-14 NOTE — ED TRIAGE NOTES
Pt has known herniated disk around level of L5 affecting L side. Pt has pain radiating down LLE which is severe. Is currently on day 3 of medrol dose pack, is taking 1000 mg robaxin every 6 hours and tylenol and ibuprofen. Pain is not controlled and hasn't slept much over the past 3 days.     Triage Assessment (Adult)       Row Name 09/14/24 0610          Triage Assessment    Airway WDL WDL        Respiratory WDL    Respiratory WDL WDL        Skin Circulation/Temperature WDL    Skin Circulation/Temperature WDL WDL        Cardiac WDL    Cardiac WDL WDL        Peripheral/Neurovascular WDL    Peripheral Neurovascular WDL WDL        Cognitive/Neuro/Behavioral WDL    Cognitive/Neuro/Behavioral WDL WDL

## 2024-09-14 NOTE — ED PROVIDER NOTES
EMERGENCY DEPARTMENT ENCOUNTER      NAME: Jared Basilio  AGE: 54 year old male  YOB: 1970  MRN: [id number]  EVALUATION DATE & TIME: 9/14/2024  6:04 AM    PCP: No Ref-Primary, Physician    ED PROVIDER: Sadi Loera MD      Chief Complaint   Patient presents with    Back Pain         FINAL IMPRESSION:  1. Lumbar back pain with radiculopathy affecting right lower extremity          ED COURSE & MEDICAL DECISION MAKING:    Pertinent Labs & Imaging studies reviewed. (See chart for details)  54 year old male presents to the Emergency Department for evaluation of back pain.  Differential diagnosis considered cauda equina syndrome, conus medullary syndrome, spinal epidural abscess, lumbar fracture.     ED Course as of 09/15/24 0732   Sat Sep 14, 2024   0620 I met with the patient, obtained history, performed an initial exam, and discussed options and plan for diagnostics and treatment here in the ED.   .  0652 Patient with known L5/S1 this protrusion causing left-sided sciatica.  Had a MRI yesterday.  Presents due to intractable pain and unable to sleep.  He has been on steroids, Tylenol, ibuprofen, Robaxin without relief.  Has not tried narcotics.  As scheduled follow-up with neurosurgery on Wednesday for lumbar injection. Presents with intractable back pain.  Denies saddle anesthesia, weakness in his lower extremities.  Has had intermittent numbness which has been chronic for many months  On exam he has 5/5 strength and intact patellar and Achilles reflexes.  No recent injury.  Doubt cauda equina, conus medullaris syndrome, spinal epidural abscess or worsening lumbar fracture.  He has been compliant with his outpatient pain medications including steroids, anti-inflammatories, Robaxin and Tylenol without relief.  He states he has not slept in a number of days.  Will provide narcotic pain medication and observe.   0726   Reevaluate patient at bedside.  No vomiting.  Pain is improved with her chronic pain  medication.  Will provide narcotic pain medication outpatient setting.  Do not believe MRI warranted today as he does not have worsening red flag symptoms and had recent MRI yesterday that was reviewed by myself.  He has follow-up with neurosurgery scheduled next week and I believe this is reasonable follow-up plan..  Amenable for discharge.       Not Applicable    I discussed the plan for discharge with the patient, and patient is agreeable. We discussed supportive cares at home and reasons for return to the ER including new or worsening symptoms - all questions and concerns addressed to the best of my ability. Strict return precautions discussed. Patient to be discharged by RN.    At the conclusion of the encounter I discussed the results of all of the tests and the disposition. The questions were answered. The patient or family acknowledged understanding and was agreeable with the care plan.     MEDICATIONS GIVEN IN THE EMERGENCY:  Medications   HYDROcodone-acetaminophen (NORCO) 5-325 MG per tablet 1 tablet (1 tablet Oral $Given 9/14/24 0638)   ondansetron (ZOFRAN ODT) ODT tab 4 mg (4 mg Oral $Given 9/14/24 0638)       NEW PRESCRIPTIONS STARTED AT TODAY'S ER VISIT  Discharge Medication List as of 9/14/2024  7:32 AM        START taking these medications    Details   HYDROcodone-acetaminophen (NORCO) 5-325 MG tablet Take 1 tablet by mouth every 4 hours as needed for severe pain., Disp-18 tablet, R-0, Local Print           Discharge Medication List as of 9/14/2024  7:32 AM          =================================================================    HPI    Patient information was obtained from: the patient and his wife    Use of : N/A        Jared Basilio is a 54 year old male with a pertinent history of lumbar sprain and lumbar disc herniation who presents to this ED for evaluation of back pain.    The patient reports experiencing 8-10/10 back pain that radiates to his left toe. He endorses tingling in  his left feet primarily. The patient's wife describes him to be hardly able to speak whenever his pain reaches a 10, and he has difficulty sleeping due to this pain, mentioning that he has had 3 hours of sleep for the past 3 days and is their biggest concern. The patient reportedly had his first back injury in April 2024 while moving. His second injury while hiking around [place], 09/02/2024, and the patient's back pain has been worsening since. He has previously received PT, which improved his back pain and helped him stand up straight. His wife notes that he has difficulty standing and sitting up straight. The patient took 4 tablets of Ibuprofen around 4:30 AM.     Yesterday, 09/13/2024, the patient received an MRI and was deemed to have a lumbar disc herniation. Nothing has changed since this MRI. The patient regularly takes prescribed steroids, NSAIDs, and muscle relaxants for his back pain. He has not tried narcotic medications yet. He additionally has a scheduled injection on Wednesday, 09/18/2024, but he and his wife are wondering if it is possible to reschedule this injection to sooner. They state that they want to avoid surgery.     Denies bowel or bladder incontinence, decreased sensation between the legs, fever, difficulty urinating, dysuria, hematuria, and abdominal pain.      PHYSICAL EXAM    BP (!) 163/101   Pulse 73   Temp 97.7  F (36.5  C) (Oral)   Resp 16   Ht 1.829 m (6')   Wt 84.8 kg (187 lb)   SpO2 96%   BMI 25.36 kg/m    Constitutional: Well developed, well nourished. Comfortable appearing.  Head: Normocephalic, atraumatic, mucous membranes moist, nose normal.   Neck: Supple, gross ROM intact.   Eyes: Pupils mid-range, sclera white.  Respiratory: Clear to auscultation bilaterally, no respiratory distress, no wheezing, speaks full sentences easily.  Back: No midline cervical spine tenderness. No midline pain with active flexion, extension, or lateral rotation bilaterally.    Cardiovascular: Normal heart rate, regular rhythm, no murmurs. No lower extremity edema.   GI: Soft, no tenderness to deep palpation in all quadrants.  Musculoskeletal: Moving all 4 extremities intentionally and without pain. No obvious deformity.  Skin: Warm, dry, no rash.  Neurologic: Alert & oriented x 3, speech clear, moving all extremities spontaneously. Intact sensation L5-S1 bilaterally.  Psychiatric: Affect normal, cooperative.       LAB:  All pertinent labs reviewed and interpreted.       RADIOLOGY:  Reviewed all pertinent imaging. Please see official radiology report.  No orders to display         Sadi Loera MD  [facility] EMERGENCY ROOM  [address] [place] [id number] [phone]   =================================================================    BILLING:  Data  Category 1  Non-ED record review, if applicable. External record reviewed: Reviewed recent office visit on 9/12/2024 patient was seen by neurosurgery for similar complaints today.     Clinical information was obtained from an independent historian. History was obtained from: Patient and Significant other provided additional information in the HPI     The following testing was considered but ultimately not selected after discussion with patient/family:  MRI however patient had MRI yesterday.     Category 2  My independent interpretation of EKG, rhythm strip, radiology study: N/A     Category 3  Discussion of management with other physician/healthcare provider/other source: N/A       Risk  Prescription medication was considered, but ultimately not given after discussion with patient/family: N/A     Chronic conditions affecting care:  Depression     Care significantly affected by Social Determinants of Health: N/A     Consideration of Admission/Observation: Escalation of care including admission/observation was considered given the complexity and risk of the patient's presenting complaint, exam  findings, and/or their underlying comorbidities. However, ultimately I feel the patient is safe for outpatient management with close follow up. Reasoning: Work-up reassuring, does not reveal any acute life/organ threatening processes, patient's symptoms well controlled upon reevaluation, reexamination is reassuring, vitals are stable, patient agreeable with discharge, reliable for follow-up.   Considered admission for intractable back pain however back pain was able to be controlled with oral narcotic pain medication.    eÃ‡ift System Documentation:   CMS Diagnoses:               I, Saima Murray, am serving as a scribe to document services personally performed by Sadi Loera MD based on my observation and the provider's statements to me. I, Sadi Loera MD, attest that Saima Murray is acting in a scribe capacity, has observed my performance of the services and has documented them in accordance with my direction.     Sadi Loera MD  09/15/24 0737

## 2024-09-14 NOTE — TELEPHONE ENCOUNTER
Had a discussion with wife this evening - she and Jared have concerns over his ability to get some sleep given severe radiating leg pain. She questions if okay to try THC to help with sleep (in combination with prescribed medications). Jared is THC-naive. Recommended against use of THC in combination with current meds (methocarbamol in particular). They plan to trial melatonin instead. Also discussed comfortable positioning, heat/ice, keeping up with prescribed medications. Outpatient [facility] is scheduled for next week. They will reach out if further questions over the weekend.

## 2024-09-14 NOTE — DISCHARGE INSTRUCTIONS
You were evaluated in the Emergency Department today for your back pain. Your evaluation did not show signs of medical conditions requiring emergent intervention at this time.    We recommend you take 600mg ibuprofen (motrin) every 6 hours or acetaminophen (tylenol) 650mg every 6 hours as needed for pain. If needed, you can alternate these medications so that you take one medication every 3 hours. For instance, at noon take ibuprofen, then at 3pm take tylenol, then at 6pm take ibuprofen.    Please schedule an appointment for follow-up with your primary care physician as instructed further evaluation of your symptoms.    Please schedule an appointment for follow-up with the spine doctor as instructed for further evaluation of your symptoms.    Return to the Emergency Department if you experience worsening back pain, difficulty walking, fevers, numbness, tingling, incontinence, or any other concerning symptoms.    Thank you for choosing us for your care.

## 2024-09-16 ENCOUNTER — TELEPHONE (OUTPATIENT)
Dept: INTERVENTIONAL RADIOLOGY/VASCULAR | Facility: CLINIC | Age: 54
End: 2024-09-16
Payer: COMMERCIAL

## 2024-09-16 NOTE — TELEPHONE ENCOUNTER
Left voicemail for patient regarding upcoming epidural steroid injection on 9/18/24. Patient given number to call back. No medication to be held.

## 2024-09-17 ENCOUNTER — TELEPHONE (OUTPATIENT)
Dept: NEUROSURGERY | Facility: CLINIC | Age: 54
End: 2024-09-17
Payer: COMMERCIAL

## 2024-09-17 NOTE — TELEPHONE ENCOUNTER
Patient was seen by Winsome Tavarez PA-C on 9/12/24 for lumbar radiculopathy. MRI was completed and reviewed by provider. Winsome recommended [facility] which is scheduled for 9/18.    Patient was seen in ED on 9/14 and given an rx for Norco #18.    Patient's wife calling to request small rx of Norco to get patient to [facility] appt tomorrow.    Attempted to reach out to patient's wife (C2C on file), no answer. Left voice message for them to call clinic back to further discuss.     [employer] message sent to patient regarding clinic medication policy.

## 2024-09-17 NOTE — TELEPHONE ENCOUNTER
[facility] Call Center    Phone Message    May a detailed message be left on voicemail: yes     Reason for Call: Other: Karina is returning call to nurse, please call back. She states to call her and her  if she does not answer at  [phone]. Please call back.      Action Taken: Message routed to:  Other: CS Neurosurgery    Travel Screening: Not Applicable

## 2024-09-17 NOTE — TELEPHONE ENCOUNTER
Attempted to reach out to patient's wife, no answer. Left voice message for them to call clinic back to further discuss.      Called patient to review clinic policy regarding narcotic medications. Reviewed OTC options and methocarbamol. Advised to contact PCP if stronger pain medications are required. Advised ED if pain/symptoms are too severe to manage at home.    Patient voiced agreement and understanding.

## 2024-09-17 NOTE — TELEPHONE ENCOUNTER
"[facility]    Phone Message    May a detailed message be left on voicemail: yes     Reason for Call: Medication Question or concern regarding medication   Prescription Clarification  Name of Medication: HYDROcodone-acetaminophen (NORCO) 5-325 MG tablet  Prescribing Provider: [facility] EMERGENCY DEPT    Pharmacy:   [facility] [place], [place] - [address]      What on the order needs clarification? Patient's spouse called requesting to speak with a nurse regarding patient's medications. She states patient has not been sleeping well the last 5 days and this medication is the only one keeping him calm and he will be out in the morning. She states patient \"is taking a lot of medication right now and is not going to sleep at all\" and feels worried. She is requesting to speak with a nurse today to figure out what the plan is for the day and moving forward. She wonders if patient could have 4-5 extra pills to get him by until injection, stating otherwise they would go back to the ER due to patient's burning feeling in feet.    Action Taken: Message routed to:  Other: CS Neurosurgery    Travel Screening: Not Applicable     Date of Service:                                                                     "

## 2024-09-18 ENCOUNTER — HOSPITAL ENCOUNTER (OUTPATIENT)
Dept: GENERAL RADIOLOGY | Facility: CLINIC | Age: 54
Discharge: HOME | End: 2024-09-18
Attending: PHYSICIAN ASSISTANT | Admitting: PHYSICIAN ASSISTANT
Payer: COMMERCIAL

## 2024-09-18 VITALS
SYSTOLIC BLOOD PRESSURE: 141 MMHG | HEART RATE: 73 BPM | DIASTOLIC BLOOD PRESSURE: 101 MMHG | OXYGEN SATURATION: 98 % | RESPIRATION RATE: 16 BRPM

## 2024-09-18 DIAGNOSIS — M54.16 LUMBAR RADICULOPATHY: ICD-10-CM

## 2024-09-18 PROCEDURE — 62323 NJX INTERLAMINAR LMBR/SAC: CPT

## 2024-09-18 PROCEDURE — 255N000002 HC RX 255 OP 636: Performed by: RADIOLOGY

## 2024-09-18 PROCEDURE — 250N000011 HC RX IP 250 OP 636: Performed by: PHYSICIAN ASSISTANT

## 2024-09-18 PROCEDURE — 250N000009 HC RX 250: Performed by: PHYSICIAN ASSISTANT

## 2024-09-18 RX ORDER — TRIAMCINOLONE ACETONIDE 40 MG/ML
80 INJECTION, SUSPENSION INTRA-ARTICULAR; INTRAMUSCULAR ONCE
Status: COMPLETED | OUTPATIENT
Start: 2024-09-18 | End: 2024-09-18

## 2024-09-18 RX ORDER — TRIAMCINOLONE ACETONIDE 40 MG/ML
INJECTION, SUSPENSION INTRA-ARTICULAR; INTRAMUSCULAR
Status: COMPLETED
Start: 2024-09-18 | End: 2024-09-18

## 2024-09-18 RX ORDER — LIDOCAINE HYDROCHLORIDE 10 MG/ML
INJECTION, SOLUTION EPIDURAL; INFILTRATION; INTRACAUDAL; PERINEURAL
Status: COMPLETED
Start: 2024-09-18 | End: 2024-09-18

## 2024-09-18 RX ORDER — IOPAMIDOL 408 MG/ML
1-10 INJECTION, SOLUTION INTRATHECAL ONCE
Status: COMPLETED | OUTPATIENT
Start: 2024-09-18 | End: 2024-09-18

## 2024-09-18 RX ADMIN — TRIAMCINOLONE ACETONIDE 80 MG: 40 INJECTION, SUSPENSION INTRA-ARTICULAR; INTRAMUSCULAR at 14:00

## 2024-09-18 RX ADMIN — LIDOCAINE HYDROCHLORIDE 5 ML: 10 INJECTION, SOLUTION EPIDURAL; INFILTRATION; INTRACAUDAL; PERINEURAL at 13:59

## 2024-09-18 RX ADMIN — IOPAMIDOL 1 ML: 408 INJECTION, SOLUTION INTRATHECAL at 14:01

## 2024-09-18 NOTE — PROGRESS NOTES
Assisted Dr. Duarte in left transforaminal epidural steroid injection. Medications given per MAR. Pain prior to procedure 8/10 at left leg. Pain post procedure 7/10 at leg. Pt tolerated procedure well. Injection site covered with dressing. Dressing clean, dry and intact at time of discharge. Discharge instructions given and all questions answered. Pt left ambulatory in stable condition.

## 2024-09-24 ENCOUNTER — OFFICE VISIT (OUTPATIENT)
Dept: NEUROSURGERY | Facility: CLINIC | Age: 54
End: 2024-09-24
Attending: PHYSICIAN ASSISTANT
Payer: COMMERCIAL

## 2024-09-24 VITALS
DIASTOLIC BLOOD PRESSURE: 107 MMHG | SYSTOLIC BLOOD PRESSURE: 159 MMHG | HEART RATE: 77 BPM | BODY MASS INDEX: 25.33 KG/M2 | WEIGHT: 187 LBS | HEIGHT: 72 IN | OXYGEN SATURATION: 99 %

## 2024-09-24 DIAGNOSIS — M54.16 LUMBAR RADICULOPATHY: Primary | ICD-10-CM

## 2024-09-24 PROCEDURE — 99213 OFFICE O/P EST LOW 20 MIN: CPT | Performed by: PHYSICIAN ASSISTANT

## 2024-09-24 RX ORDER — METHYLPREDNISOLONE 4 MG
TABLET, DOSE PACK ORAL
Qty: 21 TABLET | Refills: 0 | Status: SHIPPED | OUTPATIENT
Start: 2024-09-24

## 2024-09-24 RX ORDER — METHOCARBAMOL 500 MG/1
500 TABLET, FILM COATED ORAL 4 TIMES DAILY PRN
Qty: 30 TABLET | Refills: 0 | Status: SHIPPED | OUTPATIENT
Start: 2024-09-24

## 2024-09-24 ASSESSMENT — PAIN SCALES - GENERAL: PAINLEVEL: SEVERE PAIN (6)

## 2024-09-24 NOTE — LETTER
9/24/2024      Jared Basilio  [address] [place] [id number]      Dear Colleague,    Thank you for referring your patient, Jared Basilio, to the [facility] [place]. Please see a copy of my visit note below.    NEUROSURGERY CLINIC PROGRESS NOTE    DATE OF VISIT: 9/24/2024    HPI:     Jared Basilio is a pleasant 54 year old male who presents to the clinic today for a  follow-up visit. On 04/11/2024 we initially evaluted him for lumbar spine pain with left L5 paresthesia and weakness.   He has completed a MDP, initiatled physical therpay and was provided with a left L5-S1 TFESI on 09/18/2024 which has provided him will good alleviation.  Today he describes the symptoms as an improving dull, aching pain that initiates in the misline low lumbar region and radiates distally in  the left L5 distribution. His paresthesia and weakness have both significantly improved. In fact, he really is not experiencing any weakness any longer.   There are no bowel or bladder changes. No other concerns are voiced.      Current Outpatient Medications   Medication Sig Dispense Refill     methocarbamol (ROBAXIN) 500 MG tablet Take 1 tablet (500 mg) by mouth 4 times daily as needed for muscle spasms. 30 tablet 0     methylPREDNISolone (MEDROL DOSEPAK) 4 MG tablet therapy pack Follow Package Directions 21 tablet 0     acetaminophen (TYLENOL) 325 MG tablet Take 325-650 mg by mouth every 6 hours as needed for mild pain       albuterol (PROAIR HFA/PROVENTIL HFA/VENTOLIN HFA) 108 (90 Base) MCG/ACT inhaler INL 2 PFS PO Q 6 H PRF SOB       fluticasone-salmeterol (ADVAIR) 500-50 MCG/ACT inhaler Inhale 1 puff into the lungs every 12 hours 1 each 3     gabapentin (NEURONTIN) 100 MG capsule 100 mg       ibuprofen (ADVIL/MOTRIN) 100 MG/5ML suspension Take 10 mg/kg by mouth every 6 hours as needed for fever or moderate pain       methocarbamol (ROBAXIN) 500 MG tablet Take 1-2 tablets (500-1,000  mg) by mouth 4 times daily as needed for muscle spasms. 90 tablet 0     methocarbamol (ROBAXIN) 500 MG tablet Take 1 tablet (500 mg) by mouth 4 times daily as needed for muscle spasms 40 tablet 0     methylphenidate (DAYTRANA) 30 MG/9HR patch Place 1 patch onto the skin daily wear patch for 9 hours only each day       methylPREDNISolone (MEDROL DOSEPAK) 4 MG tablet therapy pack Follow Package Directions 21 tablet 0     ondansetron (ZOFRAN ODT) 4 MG ODT tab Take 1 tablet (4 mg) by mouth every 6 hours as needed for nausea or vomiting. 12 tablet 0     No current facility-administered medications for this visit.       Allergies   Allergen Reactions     Cats Difficulty breathing     Dust Mites Difficulty breathing     Other Environmental Allergy      Hay/straw- breathing difficulty, rash       Past Medical History:   Diagnosis Date     ADHD (attention deficit hyperactivity disorder)      Allergic rhinitis, cause unspecified      Corneal foreign body      Uncomplicated asthma      Unspecified sinusitis (chronic)        Review Of Systems    Skin: negative  Eyes: negative  Ears/Nose/Throat: negative  Respiratory: No shortness of breath, dyspnea on exertion, cough, or hemoptysis  Cardiovascular: negative  Gastrointestinal: negative  Musculoskeletal: negative  Neurologic: numbness or tingling of feet  Psychiatric: negative  Hematologic/Lymphatic/Immunologic: negative  Endocrine: negative    OBJECTIVE:    BP (!) 159/107   Pulse 77   Ht 1.829 m (6')   Wt 84.8 kg (187 lb)   SpO2 99%   BMI 25.36 kg/m      Imaging:    MRI LUMBAR SPINE WITHOUT CONTRAST   9/13/2024 11:49 AM      HISTORY: Lumbar radiculopathy with new weakness.     TECHNIQUE: Multiplanar multisequence MRI of the lumbar spine without  contrast.      COMPARISON: None.      FINDINGS: There are 5 lumbar-type vertebral bodies assumed for the  purposes of this dictation. The distal spinal cord and cauda equina  appear normal.      Straightening of the normal lumbar  lordosis. No significant loss of  vertebral body height. Small presumed Schmorl's node deformity  inferior L5 endplate with mild surrounding edema. No other focal  destructive bony lesions.     Level by level as follows:      T12-L1: No loss of disc height. No significant disc herniation. Normal  facets. No spinal canal or neural foraminal narrowing.      L1-L2: No loss of disc height. No significant disc herniation. Normal  facets. No spinal canal or neural foraminal narrowing.      L2-L3: Mild loss of disc height and signal. Posterior disc bulge with  mild endplate osteophytic spurring. Mild facet hypertrophy. No  significant spinal canal narrowing. Mild bilateral neural foraminal  narrowing.      L3-L4: Mild loss of disc height. Posterior disc bulge more pronounced  towards the left foraminal region with left greater than right  uncinate spurring. Mild facet hypertrophy. No spinal canal narrowing.  Mild right neural foraminal narrowing. Moderate left neural foraminal  narrowing.      L4-L5: Mild loss of disc height and signal. Posterior disc bulge with  endplate osteophytic spurring. Annular fusion with disc in the left  subarticular/foraminal region. Bilateral facet hypertrophy. No  significant spinal canal narrowing. Mild right neural foraminal  narrowing. Moderate left neural foraminal narrowing.      L5-S1: Moderate loss of disc height and signal with degenerative  endplate changes. Circumferential disc bulge with mild endplate  osteophytic spurring. Superimposed left foraminal disc extrusion which  extends cranially into the neural foramen and compresses the  traversing left L5 nerve root. No spinal canal narrowing. Mild right  neural foraminal narrowing. Severe left neural foraminal narrowing.      Paraspinous soft tissues: Unremarkable.                                                               IMPRESSION:    1. Large left foraminal disc extrusion at L5-S1 causing severe left  neural foraminal narrowing  which compresses the exiting left L5 nerve  root. There is also mild right neural foraminal narrowing at this  level.  2. Additional degenerative changes throughout the lumbar spine as  detailed above. Moderate left neural foraminal narrowings also at  L4-L5 and L3-L4.     MERLIN AUSTIN MD     Radiographic Findings: Full radiological report in chart. I personally reviewed the images with the patient today.    Exam:    Patient appears comfortable and in no apparent distress. Moving all extremities.  Gait is non-antalgic.  CN II-XII grossly intact, alert and appropriate with conversation and following  commands  Bilateral upper extremities with full strength including hand intrinsics and grasp.  Sensation intact throughout.  Bilateral lower extremities 5/5 strength including plantar and dorsiflexion.  Normal sensation throughout bilaterally.    ASSESSMENT:    1. Lumbar radiculopathy        PLAN:    Mr. Basilio's most recent imaging exhibits a large left foraminal disc extrusion at L5-S1 causing severe left neural foraminal narrowing which compresses the exiting left L5 nerve  root which certainly correlate with his subjective concerns and objective findings on his physical exam. However, we do not recommend surgical intervention at this time.     Based on his physical exam, imaging review, and past treatments, we feel that it would be in his best interest to continue a conservative approach by participating in his physical therapy program to include lumbar traction. This is already in motion. We also discussed obtaining a repeat epidural steroid injection at the left L5-S1 level as needed.     We would like to see him back on an as needed basis to further discuss possible surgical interventions or other conservative therapies.     We did review the images together and we explained with an anatomical model his condition and the recommended treatment. We also discussed signs of a worsening problem that he should seek  being evaluated.     The patient gave verbal understanding and is in agreement with the above plan. He will call or return to the clinic for any worsening or changes in symptoms.      Respectfully,     JACKELIN Khan, SAPNA      Again, thank you for allowing me to participate in the care of your patient.        Sincerely,        Ezra Castillo PA-C

## 2024-09-24 NOTE — NURSING NOTE
Jared Basilio is a 54 year old male who presents for:  Chief Complaint   Patient presents with    RECHECK     Lumbar radiculopathy, went to ED on 9/14 and got medications. Pain currently in hip and left leg, about a level 6 today. Patient getting PT at [facility] in [place].        Initial Vitals:  BP (!) 159/107   Pulse 77   Ht 6' (1.829 m)   Wt 187 lb (84.8 kg)   SpO2 99%   BMI 25.36 kg/m   Estimated body mass index is 25.36 kg/m  as calculated from the following:    Height as of this encounter: 6' (1.829 m).    Weight as of this encounter: 187 lb (84.8 kg). Body surface area is 2.08 meters squared. BP completed using cuff size: regular  Severe Pain (6)        [place] [facility]

## 2024-09-24 NOTE — PROGRESS NOTES
NEUROSURGERY CLINIC PROGRESS NOTE    DATE OF VISIT: 9/24/2024    HPI:     Jared Basilio is a pleasant 54 year old male who presents to the clinic today for a  follow-up visit. On 04/11/2024 we initially evaluted him for lumbar spine pain with left L5 paresthesia and weakness.   He has completed a MDP, initiatled physical therpay and was provided with a left L5-S1 TFESI on 09/18/2024 which has provided him will good alleviation.  Today he describes the symptoms as an improving dull, aching pain that initiates in the misline low lumbar region and radiates distally in  the left L5 distribution. His paresthesia and weakness have both significantly improved. In fact, he really is not experiencing any weakness any longer.   There are no bowel or bladder changes. No other concerns are voiced.      Current Outpatient Medications   Medication Sig Dispense Refill    methocarbamol (ROBAXIN) 500 MG tablet Take 1 tablet (500 mg) by mouth 4 times daily as needed for muscle spasms. 30 tablet 0    methylPREDNISolone (MEDROL DOSEPAK) 4 MG tablet therapy pack Follow Package Directions 21 tablet 0    acetaminophen (TYLENOL) 325 MG tablet Take 325-650 mg by mouth every 6 hours as needed for mild pain      albuterol (PROAIR HFA/PROVENTIL HFA/VENTOLIN HFA) 108 (90 Base) MCG/ACT inhaler INL 2 PFS PO Q 6 H PRF SOB      fluticasone-salmeterol (ADVAIR) 500-50 MCG/ACT inhaler Inhale 1 puff into the lungs every 12 hours 1 each 3    gabapentin (NEURONTIN) 100 MG capsule 100 mg      ibuprofen (ADVIL/MOTRIN) 100 MG/5ML suspension Take 10 mg/kg by mouth every 6 hours as needed for fever or moderate pain      methocarbamol (ROBAXIN) 500 MG tablet Take 1-2 tablets (500-1,000 mg) by mouth 4 times daily as needed for muscle spasms. 90 tablet 0    methocarbamol (ROBAXIN) 500 MG tablet Take 1 tablet (500 mg) by mouth 4 times daily as needed for muscle spasms 40 tablet 0    methylphenidate (DAYTRANA) 30 MG/9HR patch Place 1 patch onto the skin daily  wear patch for 9 hours only each day      methylPREDNISolone (MEDROL DOSEPAK) 4 MG tablet therapy pack Follow Package Directions 21 tablet 0    ondansetron (ZOFRAN ODT) 4 MG ODT tab Take 1 tablet (4 mg) by mouth every 6 hours as needed for nausea or vomiting. 12 tablet 0     No current facility-administered medications for this visit.       Allergies   Allergen Reactions    Cats Difficulty breathing    Dust Mites Difficulty breathing    Other Environmental Allergy      Hay/straw- breathing difficulty, rash       Past Medical History:   Diagnosis Date    ADHD (attention deficit hyperactivity disorder)     Allergic rhinitis, cause unspecified     Corneal foreign body     Uncomplicated asthma     Unspecified sinusitis (chronic)        Review Of Systems    Skin: negative  Eyes: negative  Ears/Nose/Throat: negative  Respiratory: No shortness of breath, dyspnea on exertion, cough, or hemoptysis  Cardiovascular: negative  Gastrointestinal: negative  Musculoskeletal: negative  Neurologic: numbness or tingling of feet  Psychiatric: negative  Hematologic/Lymphatic/Immunologic: negative  Endocrine: negative    OBJECTIVE:    BP (!) 159/107   Pulse 77   Ht 1.829 m (6')   Wt 84.8 kg (187 lb)   SpO2 99%   BMI 25.36 kg/m      Imaging:    MRI LUMBAR SPINE WITHOUT CONTRAST   9/13/2024 11:49 AM      HISTORY: Lumbar radiculopathy with new weakness.     TECHNIQUE: Multiplanar multisequence MRI of the lumbar spine without  contrast.      COMPARISON: None.      FINDINGS: There are 5 lumbar-type vertebral bodies assumed for the  purposes of this dictation. The distal spinal cord and cauda equina  appear normal.      Straightening of the normal lumbar lordosis. No significant loss of  vertebral body height. Small presumed Schmorl's node deformity  inferior L5 endplate with mild surrounding edema. No other focal  destructive bony lesions.     Level by level as follows:      T12-L1: No loss of disc height. No significant disc herniation.  Normal  facets. No spinal canal or neural foraminal narrowing.      L1-L2: No loss of disc height. No significant disc herniation. Normal  facets. No spinal canal or neural foraminal narrowing.      L2-L3: Mild loss of disc height and signal. Posterior disc bulge with  mild endplate osteophytic spurring. Mild facet hypertrophy. No  significant spinal canal narrowing. Mild bilateral neural foraminal  narrowing.      L3-L4: Mild loss of disc height. Posterior disc bulge more pronounced  towards the left foraminal region with left greater than right  uncinate spurring. Mild facet hypertrophy. No spinal canal narrowing.  Mild right neural foraminal narrowing. Moderate left neural foraminal  narrowing.      L4-L5: Mild loss of disc height and signal. Posterior disc bulge with  endplate osteophytic spurring. Annular fusion with disc in the left  subarticular/foraminal region. Bilateral facet hypertrophy. No  significant spinal canal narrowing. Mild right neural foraminal  narrowing. Moderate left neural foraminal narrowing.      L5-S1: Moderate loss of disc height and signal with degenerative  endplate changes. Circumferential disc bulge with mild endplate  osteophytic spurring. Superimposed left foraminal disc extrusion which  extends cranially into the neural foramen and compresses the  traversing left L5 nerve root. No spinal canal narrowing. Mild right  neural foraminal narrowing. Severe left neural foraminal narrowing.      Paraspinous soft tissues: Unremarkable.                                                               IMPRESSION:    1. Large left foraminal disc extrusion at L5-S1 causing severe left  neural foraminal narrowing which compresses the exiting left L5 nerve  root. There is also mild right neural foraminal narrowing at this  level.  2. Additional degenerative changes throughout the lumbar spine as  detailed above. Moderate left neural foraminal narrowings also at  L4-L5 and L3-L4.     MERLIN AUSTIN,  MD     Radiographic Findings: Full radiological report in chart. I personally reviewed the images with the patient today.    Exam:    Patient appears comfortable and in no apparent distress. Moving all extremities.  Gait is non-antalgic.  CN II-XII grossly intact, alert and appropriate with conversation and following  commands  Bilateral upper extremities with full strength including hand intrinsics and grasp.  Sensation intact throughout.  Bilateral lower extremities 5/5 strength including plantar and dorsiflexion.  Normal sensation throughout bilaterally.    ASSESSMENT:    1. Lumbar radiculopathy        PLAN:    Mr. Basilio's most recent imaging exhibits a large left foraminal disc extrusion at L5-S1 causing severe left neural foraminal narrowing which compresses the exiting left L5 nerve  root which certainly correlate with his subjective concerns and objective findings on his physical exam. However, we do not recommend surgical intervention at this time.     Based on his physical exam, imaging review, and past treatments, we feel that it would be in his best interest to continue a conservative approach by participating in his physical therapy program to include lumbar traction. This is already in motion. We also discussed obtaining a repeat epidural steroid injection at the left L5-S1 level as needed.     We would like to see him back on an as needed basis to further discuss possible surgical interventions or other conservative therapies.     We did review the images together and we explained with an anatomical model his condition and the recommended treatment. We also discussed signs of a worsening problem that he should seek being evaluated.     The patient gave verbal understanding and is in agreement with the above plan. He will call or return to the clinic for any worsening or changes in symptoms.      Respectfully,     JACKELIN Khan PA-C

## 2025-03-24 ENCOUNTER — OFFICE VISIT (OUTPATIENT)
Dept: URGENT CARE | Facility: URGENT CARE | Age: 55
End: 2025-03-24
Payer: COMMERCIAL

## 2025-03-24 VITALS
BODY MASS INDEX: 25.5 KG/M2 | HEART RATE: 92 BPM | SYSTOLIC BLOOD PRESSURE: 140 MMHG | TEMPERATURE: 98.2 F | RESPIRATION RATE: 24 BRPM | DIASTOLIC BLOOD PRESSURE: 93 MMHG | OXYGEN SATURATION: 97 % | WEIGHT: 188 LBS

## 2025-03-24 DIAGNOSIS — J01.90 ACUTE SINUSITIS WITH SYMPTOMS > 10 DAYS: ICD-10-CM

## 2025-03-24 DIAGNOSIS — J01.90 ACUTE SINUSITIS WITH SYMPTOMS > 10 DAYS: Primary | ICD-10-CM

## 2025-03-24 PROCEDURE — 3077F SYST BP >= 140 MM HG: CPT | Performed by: PHYSICIAN ASSISTANT

## 2025-03-24 PROCEDURE — 3080F DIAST BP >= 90 MM HG: CPT | Performed by: PHYSICIAN ASSISTANT

## 2025-03-24 PROCEDURE — 99214 OFFICE O/P EST MOD 30 MIN: CPT | Performed by: PHYSICIAN ASSISTANT

## 2025-03-24 RX ORDER — SACCHAROMYCES BOULARDII 250 MG
250 CAPSULE ORAL 2 TIMES DAILY
Qty: 28 CAPSULE | Refills: 0 | Status: SHIPPED | OUTPATIENT
Start: 2025-03-24 | End: 2025-04-07

## 2025-03-24 RX ORDER — ALBUTEROL SULFATE 90 UG/1
2 INHALANT RESPIRATORY (INHALATION) EVERY 6 HOURS PRN
Qty: 18 G | Refills: 0 | Status: SHIPPED | OUTPATIENT
Start: 2025-03-24

## 2025-03-24 RX ORDER — FLUTICASONE PROPIONATE 50 MCG
1 SPRAY, SUSPENSION (ML) NASAL DAILY
Qty: 15.8 ML | Refills: 0 | Status: SHIPPED | OUTPATIENT
Start: 2025-03-24

## 2025-03-24 RX ORDER — CODEINE PHOSPHATE AND GUAIFENESIN 10; 100 MG/5ML; MG/5ML
1-2 SOLUTION ORAL
Qty: 80 ML | Refills: 0 | Status: SHIPPED | OUTPATIENT
Start: 2025-03-24

## 2025-03-24 RX ORDER — BENZONATATE 200 MG/1
200 CAPSULE ORAL 3 TIMES DAILY PRN
Qty: 30 CAPSULE | Refills: 0 | Status: SHIPPED | OUTPATIENT
Start: 2025-03-24

## 2025-03-24 NOTE — PROGRESS NOTES
SUBJECTIVE:  Jared Basilio is a 54 year old male here with concerns about sinus infection.  He states onset of symptoms were 3 week(s) ago.  He has had maxillary, frontal pressure. Course of illness is same. Severity moderate  Current and Associated symptoms: nasal congestion, rhinorrhea, and post-nasal drainage  Predisposing factors include none. Recent treatment has included: None    Past Medical History:   Diagnosis Date    ADHD (attention deficit hyperactivity disorder)     Allergic rhinitis, cause unspecified     Corneal foreign body     Uncomplicated asthma     Unspecified sinusitis (chronic)      Social History     Tobacco Use    Smoking status: Never    Smokeless tobacco: Never   Substance Use Topics    Alcohol use: No       ROS:  Review of systems negative except as stated above.    OBJECTIVE:  BP (!) 140/93 (BP Location: Right arm, Patient Position: Sitting, Cuff Size: Adult Large)   Pulse 92   Temp 98.2  F (36.8  C) (Temporal)   Resp 24   Wt 85.3 kg (188 lb)   SpO2 97%   BMI 25.50 kg/m      Exam:  GENERAL APPEARANCE: healthy, alert and no distress  HENT: ear canals and TM's normal.  Nose and mouth without ulcers, erythema or lesions  NECK: supple, nontender, no lymphadenopathy  RESP: lungs clear to auscultation - no rales, rhonchi or wheezes  CV: regular rates and rhythm, normal S1 S2, no murmur noted  NEURO: Normal strength and tone, sensory exam grossly normal,  normal speech and mentation  SKIN: no suspicious lesions or rashes    ASSESSMENT:  (J01.90) Acute sinusitis with symptoms > 10 days  (primary encounter diagnosis)  Plan: amoxicillin-clavulanate (AUGMENTIN) 875-125 MG         tablet, saccharomyces boulardii (FLORASTOR) 250        MG capsule, fluticasone (FLONASE) 50 MCG/ACT         nasal spray, guaiFENesin-codeine (ROBITUSSIN         AC) 100-10 MG/5ML solution, albuterol (PROAIR         HFA/PROVENTIL HFA/VENTOLIN HFA) 108 (90 Base)         MCG/ACT inhaler, benzonatate (TESSALON) 200 MG          capsule    Follow up with PCP if symptoms worsen or fail to improve

## 2025-03-24 NOTE — PATIENT INSTRUCTIONS
1.  Plenty of fluids, rest, warm compresses on face  2.  Mucinex twice daily for at least 4 days  3.  Marlne Pot 1x in the morning 1x at night (SALINE MIST SPRAY IS AN ACCEPTABLE, THOUGH NOT AS EFFECTIVE REPLACEMENT)  4.  Benadryl (diphenhydramine) at bedtime   5.  Either Claritin (Loratadine), Allegra (Fexofenadine), or Zyrtec (Cetirizine) in the day  6.  Flonase (Fluticasone) 2x each nostril twice a day for two weeks, then once each nostril once a day       Please let us know if symptoms persist, or worsen.  Fever and focal pain may be a sign of a bacterial infection which may need antibiotic treatment

## 2025-03-25 RX ORDER — FLUTICASONE PROPIONATE 50 MCG
1 SPRAY, SUSPENSION (ML) NASAL DAILY
Qty: 32 G | OUTPATIENT
Start: 2025-03-25

## 2025-04-21 DIAGNOSIS — J01.90 ACUTE SINUSITIS WITH SYMPTOMS > 10 DAYS: ICD-10-CM

## 2025-04-21 RX ORDER — FLUTICASONE PROPIONATE 50 MCG
1 SPRAY, SUSPENSION (ML) NASAL DAILY
Qty: 32 G | OUTPATIENT
Start: 2025-04-21

## 2025-05-12 ENCOUNTER — OFFICE VISIT (OUTPATIENT)
Dept: URGENT CARE | Facility: URGENT CARE | Age: 55
End: 2025-05-12
Payer: COMMERCIAL

## 2025-05-12 ENCOUNTER — ANCILLARY PROCEDURE (OUTPATIENT)
Dept: GENERAL RADIOLOGY | Facility: CLINIC | Age: 55
End: 2025-05-12
Attending: PHYSICIAN ASSISTANT
Payer: COMMERCIAL

## 2025-05-12 VITALS
DIASTOLIC BLOOD PRESSURE: 90 MMHG | HEART RATE: 87 BPM | WEIGHT: 188 LBS | TEMPERATURE: 97.8 F | BODY MASS INDEX: 25.47 KG/M2 | RESPIRATION RATE: 17 BRPM | OXYGEN SATURATION: 96 % | SYSTOLIC BLOOD PRESSURE: 136 MMHG | HEIGHT: 72 IN

## 2025-05-12 DIAGNOSIS — J01.90 ACUTE NON-RECURRENT SINUSITIS, UNSPECIFIED LOCATION: ICD-10-CM

## 2025-05-12 DIAGNOSIS — R05.1 ACUTE COUGH: Primary | ICD-10-CM

## 2025-05-12 PROCEDURE — 3075F SYST BP GE 130 - 139MM HG: CPT | Performed by: PHYSICIAN ASSISTANT

## 2025-05-12 PROCEDURE — 3080F DIAST BP >= 90 MM HG: CPT | Performed by: PHYSICIAN ASSISTANT

## 2025-05-12 PROCEDURE — 71046 X-RAY EXAM CHEST 2 VIEWS: CPT | Mod: TC | Performed by: RADIOLOGY

## 2025-05-12 PROCEDURE — 99204 OFFICE O/P NEW MOD 45 MIN: CPT | Performed by: PHYSICIAN ASSISTANT

## 2025-05-12 RX ORDER — PREDNISONE 20 MG/1
40 TABLET ORAL DAILY
Qty: 10 TABLET | Refills: 0 | Status: SHIPPED | OUTPATIENT
Start: 2025-05-12 | End: 2025-05-17

## 2025-05-12 ASSESSMENT — ENCOUNTER SYMPTOMS
RHINORRHEA: 1
NAUSEA: 0
HEADACHES: 0
DIARRHEA: 0
VOMITING: 0
MYALGIAS: 0
FATIGUE: 1
SORE THROAT: 0
COUGH: 1
FEVER: 0
WHEEZING: 1
SHORTNESS OF BREATH: 1

## 2025-05-12 NOTE — PROGRESS NOTES
Urgent Care Clinic Visit    Chief Complaint   Patient presents with    Urgent Care    Cough     Cough   Hard to sleep because of cough   Benzonatate not helping cough     Nasal Congestion     Congestion and post nasal drip     Fatigue               5/12/2025     6:27 PM   Additional Questions   Roomed by joey reynaga         5/12/2025     6:27 PM   Patient Reported Additional Medications   Patient reports taking the following new medications symbicort inhaler

## 2025-05-12 NOTE — PROGRESS NOTES
SUBJECTIVE:   Jared Basilio is a 54 year old male presenting with a chief complaint of   Chief Complaint   Patient presents with    Urgent Care    Cough     Cough   Hard to sleep because of cough   Benzonatate not helping cough     Nasal Congestion     Congestion and post nasal drip     Fatigue       He is an established patient of [facility].  Cough and PND for 3 week.  No HEENT surgeries.  Cough wakes up patient.      Treatment:  tessalon perles, mucinex, flonase    Review of Systems   Constitutional:  Positive for fatigue. Negative for fever.   HENT:  Positive for congestion, postnasal drip and rhinorrhea. Negative for ear pain and sore throat.    Respiratory:  Positive for cough, shortness of breath and wheezing.    Gastrointestinal:  Negative for diarrhea, nausea and vomiting.   Musculoskeletal:  Negative for myalgias.   Skin:  Negative for rash.   Neurological:  Negative for headaches.   All other systems reviewed and are negative.      Past Medical History:   Diagnosis Date    ADHD (attention deficit hyperactivity disorder)     Allergic rhinitis, cause unspecified     Corneal foreign body     Uncomplicated asthma     Unspecified sinusitis (chronic)      Family History   Problem Relation Age of Onset    Family History Negative Mother     Prostate Cancer Father     Glaucoma No family hx of     Macular Degeneration No family hx of     Cancer No family hx of     Coronary Artery Disease No family hx of     Diabetes No family hx of      Current Outpatient Medications   Medication Sig Dispense Refill    acetaminophen (TYLENOL) 325 MG tablet Take 325-650 mg by mouth every 6 hours as needed for mild pain      albuterol (PROAIR HFA/PROVENTIL HFA/VENTOLIN HFA) 108 (90 Base) MCG/ACT inhaler Inhale 2 puffs into the lungs every 6 hours as needed for wheezing or cough. 18 g 0    albuterol (PROAIR HFA/PROVENTIL HFA/VENTOLIN HFA) 108 (90 Base) MCG/ACT inhaler INL 2 PFS PO Q 6 H PRF SOB      amoxicillin-clavulanate (AUGMENTIN)  875-125 MG tablet Take 1 tablet by mouth 2 times daily for 7 days. 14 tablet 0    benzonatate (TESSALON) 200 MG capsule Take 1 capsule (200 mg) by mouth 3 times daily as needed for cough. 30 capsule 0    fluticasone (FLONASE) 50 MCG/ACT nasal spray Spray 1 spray into both nostrils daily. 15.8 mL 0    ibuprofen (ADVIL/MOTRIN) 100 MG/5ML suspension Take 10 mg/kg by mouth every 6 hours as needed for fever or moderate pain      methylphenidate (DAYTRANA) 30 MG/9HR patch Place 1 patch onto the skin daily wear patch for 9 hours only each day      predniSONE (DELTASONE) 20 MG tablet Take 2 tablets (40 mg) by mouth daily for 5 days. 10 tablet 0    fluticasone-salmeterol (ADVAIR) 500-50 MCG/ACT inhaler Inhale 1 puff into the lungs every 12 hours (Patient not taking: Reported on 5/12/2025) 1 each 3    gabapentin (NEURONTIN) 100 MG capsule 100 mg (Patient not taking: Reported on 5/12/2025)      guaiFENesin-codeine (ROBITUSSIN AC) 100-10 MG/5ML solution Take 5-10 mLs by mouth nightly as needed for cough. (Patient not taking: Reported on 5/12/2025) 80 mL 0    methocarbamol (ROBAXIN) 500 MG tablet Take 1 tablet (500 mg) by mouth 4 times daily as needed for muscle spasms. (Patient not taking: Reported on 5/12/2025) 30 tablet 0    methocarbamol (ROBAXIN) 500 MG tablet Take 1-2 tablets (500-1,000 mg) by mouth 4 times daily as needed for muscle spasms. (Patient not taking: Reported on 5/12/2025) 90 tablet 0    methocarbamol (ROBAXIN) 500 MG tablet Take 1 tablet (500 mg) by mouth 4 times daily as needed for muscle spasms (Patient not taking: Reported on 5/12/2025) 40 tablet 0    methylPREDNISolone (MEDROL DOSEPAK) 4 MG tablet therapy pack Follow Package Directions (Patient not taking: Reported on 5/12/2025) 21 tablet 0    methylPREDNISolone (MEDROL DOSEPAK) 4 MG tablet therapy pack Follow Package Directions (Patient not taking: Reported on 5/12/2025) 21 tablet 0    ondansetron (ZOFRAN ODT) 4 MG ODT tab Take 1 tablet (4 mg) by mouth  every 6 hours as needed for nausea or vomiting. (Patient not taking: Reported on 5/12/2025) 12 tablet 0     Social History     Tobacco Use    Smoking status: Never    Smokeless tobacco: Never   Substance Use Topics    Alcohol use: No       OBJECTIVE  /90   Pulse 87   Temp 97.8  F (36.6  C) (Temporal)   Resp 17   Ht 1.829 m (6')   Wt 85.3 kg (188 lb)   SpO2 96%   BMI 25.50 kg/m      Physical Exam  Vitals and nursing note reviewed.   Constitutional:       Appearance: Normal appearance. He is normal weight.   HENT:      Head: Normocephalic and atraumatic.      Right Ear: Tympanic membrane, ear canal and external ear normal.      Left Ear: Tympanic membrane, ear canal and external ear normal.      Nose: Nose normal.      Mouth/Throat:      Mouth: Mucous membranes are moist.      Pharynx: Oropharynx is clear.   Eyes:      Extraocular Movements: Extraocular movements intact.      Conjunctiva/sclera: Conjunctivae normal.   Cardiovascular:      Rate and Rhythm: Normal rate and regular rhythm.      Pulses: Normal pulses.      Heart sounds: Normal heart sounds.   Pulmonary:      Effort: Pulmonary effort is normal.      Breath sounds: Normal breath sounds.   Musculoskeletal:      Cervical back: Normal range of motion and neck supple. No rigidity. No muscular tenderness.   Skin:     General: Skin is warm and dry.   Neurological:      General: No focal deficit present.      Mental Status: He is alert.   Psychiatric:         Mood and Affect: Mood normal.         Behavior: Behavior normal.         Labs:  No results found for this or any previous visit (from the past 24 hours).    X-Ray was done, my findings are: Xrays reviewed by myself and independently interpreted.  Any significant discrepancies with official radiologic read, patient will be notified.      NAD    ASSESSMENT:      ICD-10-CM    1. Acute cough  R05.1 XR Chest 2 Views      2. Acute non-recurrent sinusitis, unspecified location  J01.90  amoxicillin-clavulanate (AUGMENTIN) 875-125 MG tablet     predniSONE (DELTASONE) 20 MG tablet           Medical Decision Making:    Differential Diagnosis:  URI Adult/Peds:  Bronchitis-viral, Bronchospasm, and Sinusitis    Serious Comorbid Conditions:  Adult:  reviewed    PLAN:    Rx for prednisone and augentin.  Continue flonase.  Discussed reasons to seek immediate medical attention.  Additionally if no improvement or worsening in one week, may follow up with PCP and/or UC.        Followup:    If not improving or if condition worsens, follow up with your Primary Care Provider, If not improving or if conditions worsens over the next 12-24 hours, go to the Emergency Department    There are no Patient Instructions on file for this visit.

## 2025-06-14 ENCOUNTER — HEALTH MAINTENANCE LETTER (OUTPATIENT)
Age: 55
End: 2025-06-14